# Patient Record
Sex: MALE | Race: WHITE | Employment: UNEMPLOYED | URBAN - METROPOLITAN AREA
[De-identification: names, ages, dates, MRNs, and addresses within clinical notes are randomized per-mention and may not be internally consistent; named-entity substitution may affect disease eponyms.]

---

## 2018-09-12 ENCOUNTER — APPOINTMENT (OUTPATIENT)
Dept: GENERAL RADIOLOGY | Age: 58
DRG: 140 | End: 2018-09-12
Attending: PHYSICIAN ASSISTANT
Payer: MEDICAID

## 2018-09-12 ENCOUNTER — HOSPITAL ENCOUNTER (INPATIENT)
Age: 58
LOS: 2 days | Discharge: HOME | DRG: 140 | End: 2018-09-14
Attending: EMERGENCY MEDICINE | Admitting: INTERNAL MEDICINE
Payer: MEDICAID

## 2018-09-12 DIAGNOSIS — R09.02 HYPOXIA: ICD-10-CM

## 2018-09-12 DIAGNOSIS — F17.200 TOBACCO DEPENDENCE: ICD-10-CM

## 2018-09-12 DIAGNOSIS — J44.1 COPD EXACERBATION (HCC): Primary | ICD-10-CM

## 2018-09-12 PROBLEM — J44.0 COPD (CHRONIC OBSTRUCTIVE PULMONARY DISEASE) WITH ACUTE BRONCHITIS (HCC): Status: ACTIVE | Noted: 2018-09-12

## 2018-09-12 PROBLEM — E11.9 DM (DIABETES MELLITUS) (HCC): Status: ACTIVE | Noted: 2018-09-12

## 2018-09-12 PROBLEM — E87.0 HYPERNATREMIA: Status: ACTIVE | Noted: 2018-09-12

## 2018-09-12 PROBLEM — J44.9 COPD (CHRONIC OBSTRUCTIVE PULMONARY DISEASE) (HCC): Status: ACTIVE | Noted: 2018-09-12

## 2018-09-12 PROBLEM — J20.9 COPD (CHRONIC OBSTRUCTIVE PULMONARY DISEASE) WITH ACUTE BRONCHITIS (HCC): Status: ACTIVE | Noted: 2018-09-12

## 2018-09-12 LAB
ALBUMIN SERPL-MCNC: 3.1 G/DL (ref 3.4–5)
ALBUMIN/GLOB SERPL: 0.9 {RATIO} (ref 0.8–1.7)
ALP SERPL-CCNC: 61 U/L (ref 45–117)
ALT SERPL-CCNC: 47 U/L (ref 16–61)
ANION GAP SERPL CALC-SCNC: 7 MMOL/L (ref 3–18)
AST SERPL-CCNC: 22 U/L (ref 15–37)
BASOPHILS # BLD: 0 K/UL (ref 0–0.1)
BASOPHILS NFR BLD: 1 % (ref 0–2)
BILIRUB SERPL-MCNC: 0.3 MG/DL (ref 0.2–1)
BNP SERPL-MCNC: 16 PG/ML (ref 0–900)
BUN SERPL-MCNC: 19 MG/DL (ref 7–18)
BUN/CREAT SERPL: 19 (ref 12–20)
CALCIUM SERPL-MCNC: 8.7 MG/DL (ref 8.5–10.1)
CHLORIDE SERPL-SCNC: 107 MMOL/L (ref 100–108)
CK MB CFR SERPL CALC: NORMAL % (ref 0–4)
CK MB CFR SERPL CALC: NORMAL % (ref 0–4)
CK MB SERPL-MCNC: <1 NG/ML (ref 5–25)
CK MB SERPL-MCNC: <1 NG/ML (ref 5–25)
CK SERPL-CCNC: 88 U/L (ref 39–308)
CK SERPL-CCNC: 92 U/L (ref 39–308)
CO2 SERPL-SCNC: 33 MMOL/L (ref 21–32)
CREAT SERPL-MCNC: 1.02 MG/DL (ref 0.6–1.3)
DIFFERENTIAL METHOD BLD: NORMAL
EOSINOPHIL # BLD: 0.2 K/UL (ref 0–0.4)
EOSINOPHIL NFR BLD: 3 % (ref 0–5)
ERYTHROCYTE [DISTWIDTH] IN BLOOD BY AUTOMATED COUNT: 13.2 % (ref 11.6–14.5)
EST. AVERAGE GLUCOSE BLD GHB EST-MCNC: 128 MG/DL
GLOBULIN SER CALC-MCNC: 3.6 G/DL (ref 2–4)
GLUCOSE BLD STRIP.AUTO-MCNC: 161 MG/DL (ref 70–110)
GLUCOSE SERPL-MCNC: 125 MG/DL (ref 74–99)
HBA1C MFR BLD: 6.1 % (ref 4.5–5.6)
HCT VFR BLD AUTO: 45.7 % (ref 36–48)
HGB BLD-MCNC: 15.5 G/DL (ref 13–16)
LACTATE SERPL-SCNC: 1.9 MMOL/L (ref 0.4–2)
LYMPHOCYTES # BLD: 2 K/UL (ref 0.9–3.6)
LYMPHOCYTES NFR BLD: 33 % (ref 21–52)
MCH RBC QN AUTO: 32.8 PG (ref 24–34)
MCHC RBC AUTO-ENTMCNC: 33.9 G/DL (ref 31–37)
MCV RBC AUTO: 96.6 FL (ref 74–97)
MONOCYTES # BLD: 0.5 K/UL (ref 0.05–1.2)
MONOCYTES NFR BLD: 8 % (ref 3–10)
NEUTS SEG # BLD: 3.3 K/UL (ref 1.8–8)
NEUTS SEG NFR BLD: 55 % (ref 40–73)
PLATELET # BLD AUTO: 244 K/UL (ref 135–420)
PMV BLD AUTO: 10.2 FL (ref 9.2–11.8)
POTASSIUM SERPL-SCNC: 4 MMOL/L (ref 3.5–5.5)
PROT SERPL-MCNC: 6.7 G/DL (ref 6.4–8.2)
RBC # BLD AUTO: 4.73 M/UL (ref 4.7–5.5)
SODIUM SERPL-SCNC: 147 MMOL/L (ref 136–145)
TROPONIN I SERPL-MCNC: <0.02 NG/ML (ref 0–0.06)
TROPONIN I SERPL-MCNC: <0.02 NG/ML (ref 0–0.06)
WBC # BLD AUTO: 5.9 K/UL (ref 4.6–13.2)

## 2018-09-12 PROCEDURE — 96361 HYDRATE IV INFUSION ADD-ON: CPT

## 2018-09-12 PROCEDURE — 85025 COMPLETE CBC W/AUTO DIFF WBC: CPT | Performed by: PHYSICIAN ASSISTANT

## 2018-09-12 PROCEDURE — 83605 ASSAY OF LACTIC ACID: CPT | Performed by: INTERNAL MEDICINE

## 2018-09-12 PROCEDURE — 82550 ASSAY OF CK (CPK): CPT | Performed by: PHYSICIAN ASSISTANT

## 2018-09-12 PROCEDURE — 93005 ELECTROCARDIOGRAM TRACING: CPT

## 2018-09-12 PROCEDURE — 99285 EMERGENCY DEPT VISIT HI MDM: CPT

## 2018-09-12 PROCEDURE — 94640 AIRWAY INHALATION TREATMENT: CPT

## 2018-09-12 PROCEDURE — 83036 HEMOGLOBIN GLYCOSYLATED A1C: CPT | Performed by: INTERNAL MEDICINE

## 2018-09-12 PROCEDURE — 87040 BLOOD CULTURE FOR BACTERIA: CPT | Performed by: INTERNAL MEDICINE

## 2018-09-12 PROCEDURE — 74011000250 HC RX REV CODE- 250: Performed by: PHYSICIAN ASSISTANT

## 2018-09-12 PROCEDURE — 65270000029 HC RM PRIVATE

## 2018-09-12 PROCEDURE — 94762 N-INVAS EAR/PLS OXIMTRY CONT: CPT

## 2018-09-12 PROCEDURE — 83880 ASSAY OF NATRIURETIC PEPTIDE: CPT | Performed by: PHYSICIAN ASSISTANT

## 2018-09-12 PROCEDURE — 71045 X-RAY EXAM CHEST 1 VIEW: CPT

## 2018-09-12 PROCEDURE — 36415 COLL VENOUS BLD VENIPUNCTURE: CPT | Performed by: INTERNAL MEDICINE

## 2018-09-12 PROCEDURE — 80053 COMPREHEN METABOLIC PANEL: CPT | Performed by: PHYSICIAN ASSISTANT

## 2018-09-12 PROCEDURE — 74011000250 HC RX REV CODE- 250: Performed by: INTERNAL MEDICINE

## 2018-09-12 PROCEDURE — 82962 GLUCOSE BLOOD TEST: CPT

## 2018-09-12 PROCEDURE — 77030013140 HC MSK NEB VYRM -A

## 2018-09-12 PROCEDURE — 96374 THER/PROPH/DIAG INJ IV PUSH: CPT

## 2018-09-12 PROCEDURE — 74011636637 HC RX REV CODE- 636/637: Performed by: INTERNAL MEDICINE

## 2018-09-12 PROCEDURE — 74011250636 HC RX REV CODE- 250/636: Performed by: PHYSICIAN ASSISTANT

## 2018-09-12 PROCEDURE — 74011250636 HC RX REV CODE- 250/636: Performed by: INTERNAL MEDICINE

## 2018-09-12 RX ORDER — IPRATROPIUM BROMIDE AND ALBUTEROL SULFATE 2.5; .5 MG/3ML; MG/3ML
3 SOLUTION RESPIRATORY (INHALATION)
Status: COMPLETED | OUTPATIENT
Start: 2018-09-12 | End: 2018-09-12

## 2018-09-12 RX ORDER — IPRATROPIUM BROMIDE AND ALBUTEROL SULFATE 2.5; .5 MG/3ML; MG/3ML
3 SOLUTION RESPIRATORY (INHALATION)
Status: DISCONTINUED | OUTPATIENT
Start: 2018-09-12 | End: 2018-09-14 | Stop reason: HOSPADM

## 2018-09-12 RX ORDER — HEPARIN SODIUM 5000 [USP'U]/ML
5000 INJECTION, SOLUTION INTRAVENOUS; SUBCUTANEOUS EVERY 8 HOURS
Status: CANCELLED | OUTPATIENT
Start: 2018-09-12

## 2018-09-12 RX ORDER — SODIUM CHLORIDE 9 MG/ML
100 INJECTION, SOLUTION INTRAVENOUS CONTINUOUS
Status: DISCONTINUED | OUTPATIENT
Start: 2018-09-12 | End: 2018-09-13

## 2018-09-12 RX ORDER — INSULIN LISPRO 100 [IU]/ML
INJECTION, SOLUTION INTRAVENOUS; SUBCUTANEOUS
Status: DISCONTINUED | OUTPATIENT
Start: 2018-09-12 | End: 2018-09-14 | Stop reason: HOSPADM

## 2018-09-12 RX ORDER — MAGNESIUM SULFATE 100 %
4 CRYSTALS MISCELLANEOUS AS NEEDED
Status: DISCONTINUED | OUTPATIENT
Start: 2018-09-12 | End: 2018-09-14 | Stop reason: HOSPADM

## 2018-09-12 RX ORDER — SODIUM CHLORIDE 0.9 % (FLUSH) 0.9 %
5-10 SYRINGE (ML) INJECTION AS NEEDED
Status: DISCONTINUED | OUTPATIENT
Start: 2018-09-12 | End: 2018-09-14 | Stop reason: HOSPADM

## 2018-09-12 RX ORDER — SODIUM CHLORIDE 0.9 % (FLUSH) 0.9 %
5-10 SYRINGE (ML) INJECTION EVERY 8 HOURS
Status: DISCONTINUED | OUTPATIENT
Start: 2018-09-12 | End: 2018-09-14 | Stop reason: HOSPADM

## 2018-09-12 RX ORDER — DEXTROSE 50 % IN WATER (D50W) INTRAVENOUS SYRINGE
25-50 AS NEEDED
Status: DISCONTINUED | OUTPATIENT
Start: 2018-09-12 | End: 2018-09-14 | Stop reason: HOSPADM

## 2018-09-12 RX ORDER — LEVOFLOXACIN 5 MG/ML
750 INJECTION, SOLUTION INTRAVENOUS EVERY 24 HOURS
Status: DISCONTINUED | OUTPATIENT
Start: 2018-09-12 | End: 2018-09-14 | Stop reason: HOSPADM

## 2018-09-12 RX ADMIN — INSULIN LISPRO 2 UNITS: 100 INJECTION, SOLUTION INTRAVENOUS; SUBCUTANEOUS at 21:36

## 2018-09-12 RX ADMIN — SODIUM CHLORIDE 100 ML/HR: 900 INJECTION, SOLUTION INTRAVENOUS at 21:36

## 2018-09-12 RX ADMIN — SODIUM CHLORIDE 1000 ML: 900 INJECTION, SOLUTION INTRAVENOUS at 17:32

## 2018-09-12 RX ADMIN — METHYLPREDNISOLONE SODIUM SUCCINATE 125 MG: 125 INJECTION, POWDER, FOR SOLUTION INTRAMUSCULAR; INTRAVENOUS at 17:32

## 2018-09-12 RX ADMIN — IPRATROPIUM BROMIDE AND ALBUTEROL SULFATE 3 ML: .5; 3 SOLUTION RESPIRATORY (INHALATION) at 17:51

## 2018-09-12 RX ADMIN — IPRATROPIUM BROMIDE AND ALBUTEROL SULFATE 3 ML: .5; 3 SOLUTION RESPIRATORY (INHALATION) at 23:51

## 2018-09-12 RX ADMIN — LEVOFLOXACIN 750 MG: 5 INJECTION, SOLUTION INTRAVENOUS at 21:37

## 2018-09-12 RX ADMIN — IPRATROPIUM BROMIDE AND ALBUTEROL SULFATE 3 ML: .5; 3 SOLUTION RESPIRATORY (INHALATION) at 19:40

## 2018-09-12 RX ADMIN — Medication 10 ML: at 21:37

## 2018-09-12 NOTE — ED PROVIDER NOTES
EMERGENCY DEPARTMENT HISTORY AND PHYSICAL EXAM 
 
Date: 9/12/2018 Patient Name: Dorathy Severin History of Presenting Illness Chief Complaint Patient presents with  Cough  Shortness of Breath History Provided By: Patient Chief Complaint: cough Duration: 3-4 Days Timing:  Worsening Location: chest 
Quality: productive Severity: 3 out of 10 Modifying Factors: coughing up green phlegm Associated Symptoms: wheezing, diaphoresis, SOB, dizziness, visual disturbance (black dots), and chest congestion Additional History (Context):  
5:23 PM 
Dorathy Severin is a 60 y.o. male with PMHX of COPD, DM, HLD who presents to the emergency department C/O productive cough (green phlegm). Associated sxs include wheezing, diaphoresis, SOB, dizziness, visual disturbance (black dots), and chest congestion. Pt reports he has not been able to lie down or sleep without having a \"coughing fit\". States he has a hernia and the coughing has made it protrude more. When trying to walk the pt is very dizzy and is seeing \"black dots\". Recently had a CT done at [facility] for concern of lung cancer; CT resulted negative. Pt has not been compliant with his COPD or DM medication. Being followed by URIEL Wells (family medicine). Pt denies chest pain, fever, chills, and any other sxs or complaints. PCP: Judy Mast MD 
 
 
 
Past History Past Medical History: 
Past Medical History:  
Diagnosis Date  COPD (chronic obstructive pulmonary disease) (HCC)  Diabetes ([address]  Emphysema lung ([address]  Hernia, abdominal   
 Hypercholesteremia Past Surgical History: 
Past Surgical History:  
Procedure Laterality Date  HX BACK SURGERY Family History: 
History reviewed. No pertinent family history. Social History: 
Social History Substance Use Topics  Smoking status: Current Every Day Smoker Packs/day: 0.50  Smokeless tobacco: Never Used  Alcohol use No  
 
 
 Allergies: Allergies Allergen Reactions  Oxycodone Anaphylaxis Review of Systems Review of Systems Constitutional: Positive for diaphoresis. Negative for chills and fever. HENT: Positive for congestion (chest congestion). Eyes: Positive for visual disturbance (black dots). Respiratory: Positive for cough (productive cough (green phlegm)), shortness of breath and wheezing. Cardiovascular: Negative for chest pain. Neurological: Positive for dizziness. All other systems reviewed and are negative. Physical Exam  
 
Vitals:  
 09/12/18 1925 09/12/18 1930 09/12/18 1939 09/12/18 1958 BP: 122/65 (!) 107/93 Pulse: 67 68 Resp: 18 Temp:      
SpO2: 91% 93% 92% 95% Weight:      
Height:      
 
Physical Exam  
Constitutional: He is oriented to person, place, and time. He appears well-developed and well-nourished. No distress. HENT:  
Head: Normocephalic and atraumatic. Eyes: Conjunctivae and EOM are normal. Pupils are equal, round, and reactive to light. Neck: Normal range of motion. Neck supple. Cardiovascular: Normal rate and regular rhythm. Pulmonary/Chest: Effort normal. No tachypnea. He has wheezes. He has rhonchi. +Harsh breath sounds with expiratory wheezing, +wet sounding productive cough Musculoskeletal: Normal range of motion. Neurological: He is alert and oriented to person, place, and time. Skin: Skin is warm and dry. Psychiatric: He has a normal mood and affect. His behavior is normal.  
Nursing note and vitals reviewed. Diagnostic Study Results Labs - Recent Results (from the past 12 hour(s)) CBC WITH AUTOMATED DIFF Collection Time: 09/12/18  6:00 PM  
Result Value Ref Range WBC 5.9 4.6 - 13.2 K/uL  
 RBC 4.73 4.70 - 5.50 M/uL  
 HGB 15.5 13.0 - 16.0 g/dL HCT 45.7 36.0 - 48.0 % MCV 96.6 74.0 - 97.0 FL  
 MCH 32.8 24.0 - 34.0 PG  
 MCHC 33.9 31.0 - 37.0 g/dL  
 RDW 13.2 11.6 - 14.5 % PLATELET 578 907 - 351 K/uL MPV 10.2 9.2 - 11.8 FL  
 NEUTROPHILS 55 40 - 73 % LYMPHOCYTES 33 21 - 52 % MONOCYTES 8 3 - 10 % EOSINOPHILS 3 0 - 5 % BASOPHILS 1 0 - 2 %  
 ABS. NEUTROPHILS 3.3 1.8 - 8.0 K/UL  
 ABS. LYMPHOCYTES 2.0 0.9 - 3.6 K/UL  
 ABS. MONOCYTES 0.5 0.05 - 1.2 K/UL  
 ABS. EOSINOPHILS 0.2 0.0 - 0.4 K/UL  
 ABS. BASOPHILS 0.0 0.0 - 0.1 K/UL  
 DF AUTOMATED METABOLIC PANEL, COMPREHENSIVE Collection Time: 09/12/18  6:00 PM  
Result Value Ref Range Sodium 147 (H) 136 - 145 mmol/L Potassium 4.0 3.5 - 5.5 mmol/L Chloride 107 100 - 108 mmol/L  
 CO2 33 (H) 21 - 32 mmol/L Anion gap 7 3.0 - 18 mmol/L Glucose 125 (H) 74 - 99 mg/dL BUN 19 (H) 7.0 - 18 MG/DL Creatinine 1.02 0.6 - 1.3 MG/DL  
 BUN/Creatinine ratio 19 12 - 20 GFR est AA >60 >60 ml/min/1.73m2 GFR est non-AA >60 >60 ml/min/1.73m2 Calcium 8.7 8.5 - 10.1 MG/DL Bilirubin, total 0.3 0.2 - 1.0 MG/DL  
 ALT (SGPT) 47 16 - 61 U/L  
 AST (SGOT) 22 15 - 37 U/L Alk. phosphatase 61 45 - 117 U/L Protein, total 6.7 6.4 - 8.2 g/dL Albumin 3.1 (L) 3.4 - 5.0 g/dL Globulin 3.6 2.0 - 4.0 g/dL A-G Ratio 0.9 0.8 - 1.7 CARDIAC PANEL,(CK, CKMB & TROPONIN) Collection Time: 09/12/18  6:00 PM  
Result Value Ref Range CK 88 39 - 308 U/L  
 CK - MB <1.0 <3.6 ng/ml CK-MB Index  0.0 - 4.0 % CALCULATION NOT PERFORMED WHEN RESULT IS BELOW LINEAR LIMIT Troponin-I, Qt. <0.02 0.00 - 0.06 NG/ML  
NT-PRO BNP Collection Time: 09/12/18  6:00 PM  
Result Value Ref Range NT pro-BNP 16 0 - 900 PG/ML  
EKG, 12 LEAD, INITIAL Collection Time: 09/12/18  6:29 PM  
Result Value Ref Range Ventricular Rate 64 BPM  
 Atrial Rate 64 BPM  
 P-R Interval 130 ms QRS Duration 86 ms  
 Q-T Interval 408 ms QTC Calculation (Bezet) 420 ms Calculated P Axis 45 degrees Calculated R Axis 77 degrees Calculated T Axis 77 degrees Diagnosis Normal sinus rhythm Normal ECG 
 No previous ECGs available Radiologic Studies -  
XR CHEST PORT Final Result Findings of mild pulmonary edema. As read by the radiologist.  
 
CT Results  (Last 48 hours) None CXR Results  (Last 48 hours) 09/12/18 1741  XR CHEST PORT Final result Impression:  IMPRESSION:  
   
Findings of mild pulmonary edema.  
   
_______________ Narrative:  EXAM: XR CHEST PORT  
   
CLINICAL HISTORY: cough.  
-Additional: None. TECHNIQUE: A portable erect AP radiographic view of the chest is reviewed  
without comparison. _______________ FINDINGS:  
   
There is diffuse vascular and interstitial prominence, consistent with mild  
pulmonary edema. The pleural spaces are clear. The cardiac silhouette, maya, and  
mediastinal contours are normal for age. No acute osseous abnormality is  
revealed. _______________ Medications given in the ED- Medications  
albuterol-ipratropium (DUO-NEB) 2.5 MG-0.5 MG/3 ML (3 mL Nebulization Given 9/12/18 1751) methylPREDNISolone (PF) (SOLU-MEDROL) injection 125 mg (125 mg IntraVENous Given 9/12/18 1732) sodium chloride 0.9 % bolus infusion 1,000 mL (0 mL IntraVENous IV Completed 9/12/18 1932)  
albuterol-ipratropium (DUO-NEB) 2.5 MG-0.5 MG/3 ML (3 mL Nebulization Given 9/12/18 1751)  
albuterol-ipratropium (DUO-NEB) 2.5 MG-0.5 MG/3 ML (3 mL Nebulization Given 9/12/18 1940) Medical Decision Making I am the first provider for this patient. I reviewed the vital signs, available nursing notes, past medical history, past surgical history, family history and social history. Vital Signs-Reviewed the patient's vital signs. EKG interpretation: (Preliminary) 6:29 PM  
64 bpm; NSR; No ST Elevation; No STEMI 
EKG read by Silvio Campa PA-C at 6:32 PM  
 
CARDIAC MONITOR: 
Cardiac Rhythm: NSR Rate: 68 bpm 
 
Pulse Oximetry Analysis - 95% on RA Records Reviewed: Nursing Notes Procedures: Procedures ED Course:  
5:23 PM 
Initial assessment performed. The patients presenting problems have been discussed, and they are in agreement with the care plan formulated and outlined with them. I have encouraged them to ask questions as they arise throughout their visit. 6:35 PM After 2 nebulizer treatments, pt still with some wheezing and harsh breath sounds. Very productive wet sounding cough. Pts O2 sat during discussion of results hovering around 90% on RA, however he did have a couple of dips to 88% and 89% on RA. Will order another nebulizer treatment and reassess. Pt likely will require admission. 6:40 PM Discussed patient's history, exam, vital signs, labs and available diagnostics results with Benito Sanchez DO, ED Attending, who agrees with admission. FACE-TO-FACE PROGRESS NOTE: 
1052 PM 
60year old homeless male, active smoker, presenting with chronic cough but worsening SOB. Noted at triage that pt at 89% ORA. On exam he appears mildly dyspneic with speaking and saturating at 90%. Rhonchus breath sounds with bibasilar crackles and expiratory wheezes. Pt CXR showed mild pulmonary edema. Will add BNP to workup to evaluate if pt needs diuresis. Due to hypoxia will likely need admission. Cardiac enzymes negative. I have personally seen and examined the patient, reviewed the REGINALDO's note and agree with findings and plan. Written by Nadira Orellana, ED Scribe, as dictated by Benito Sanchez DO. 
 
7:40 PM Discussed patient's history, exam, and available diagnostics results with Francesca Sutherland MD, internal medicine, who accepts the pt for admission to remote tele. Diagnosis and Disposition Core Measures: 
For Hospitalized Patients: 
 
1. Hospitalization Decision Time: The decision to hospitalize the patient was made by Flip Deal PA-C at 6:35 PM on 9/12/2018 2.  Aspirin: Aspirin was not given because the patient did not present with a stroke at the time of their Emergency Department evaluation 7:44 PM  Patient is being admitted to the hospital by Vinny Rosario MD to remote tele. The results of their tests and reasons for their admission have been discussed with them and/or available family. They convey agreement and understanding for the need to be admitted and for their admission diagnosis. CONDITIONS ON ADMISSION: 
Sepsis is not present at the time of admission. Deep Vein Thrombosis is not present at the time of admission. Thrombosis is not present at the time of admission. Urinary Tract Infection is not present at the time of admission. Pneumonia is not present at the time of admission. MRSA is not present at the time of admission. Wound infection is not present at the time of admission. Pressure Ulcer is not present at the time of admission. CLINICAL IMPRESSION: 
 
1. COPD exacerbation ([address] 2. Hypoxia 3. Tobacco dependence   
  
_______________________ Attestations: This note is prepared by Itzel Tapia, acting as Scribe for Silvio Campa PA-C. Silvio Campa PA-C:  The scribe's documentation has been prepared under my direction and personally reviewed by me in its entirety. I confirm that the note above accurately reflects all work, treatment, procedures, and medical decision making performed by me. 
 
_______________________________

## 2018-09-12 NOTE — ED TRIAGE NOTES
Triage: pt reports productive cough x 3 days. SOB with exertion. Pt states that he is homeless and has been living in his car x 4 years.

## 2018-09-12 NOTE — H&P
History & Physical 
 
Patient: Nic Gotti MRN: [id number]  CSN: [id number] YOB: 1960  Age: 60 y.o. Sex: male DOA: 9/12/2018 Chief Complaint:  
Chief Complaint Patient presents with  Cough  Shortness of Breath HPI:  
 
Nic Gotti is a 60 y.o.  male who has hx of COPD, DM , Sleep Apnea Presents to ER with coughing spells and worsening shortness of breath for the last four days Patient was on oxygen in past which was lost when he became homeless currently living in a mobile [facility] has chronic dyspnea followed by NP in [place] for Advair refills ect. Casie Avery Continues  to smoke half a pack day but lately having trouble inhaling full cigarette and has had to cut back due to worsening shortness of breath . In ER had episodes of hypoxemia down to 88 86 percent on RA despite Being treated with Duo neb, Solumedrol ect. .. Past Medical History:  
Diagnosis Date  COPD (chronic obstructive pulmonary disease) (HCC)  [address] [address]  Emphysema lung ([facility] [address]  Hernia, abdominal   
 Hypercholesteremia Past Surgical History:  
Procedure Laterality Date  HX BACK SURGERY History reviewed. No pertinent family history. Social History Social History  Marital status:  Spouse name: N/A  
 Number of children: N/A  
 Years of education: N/A Social History Main Topics  Smoking status: Current Every Day Smoker Packs/day: 0.50  Smokeless tobacco: Never Used  Alcohol use No  
 Drug use: None  Sexual activity: Not Asked Other Topics Concern  None Social History Narrative  None Prior to Admission medications Not on File Allergies Allergen Reactions  Oxycodone Anaphylaxis Review of Systems GENERAL: Patient alert, awake and oriented times 3, able to communicate full sentences and not in distress. HEENT: No change in vision, no earache, tinnitus, sore throat or sinus congestion. NECK: No pain or stiffness. PULMONARY: + shortness of breath,+ cough +wheeze. Cardiovascular: no pnd or orthopnea, no CP GASTROINTESTINAL: No abdominal pain, nausea, vomiting or diarrhea, melena or bright red blood per rectum. GENITOURINARY: No urinary frequency, urgency, hesitancy or dysuria. MUSCULOSKELETAL: No joint or muscle pain, no back pain, no recent trauma. DERMATOLOGIC: No rash, no itching, no lesions. ENDOCRINE: No polyuria, polydipsia, no heat or cold intolerance. No recent change in weight. HEMATOLOGICAL: No anemia or easy bruising or bleeding. NEUROLOGIC: No headache, seizures, numbness, tingling or weakness. Physical Exam:  
 
Physical Exam: 
Visit Vitals  BP (!) 107/93  Pulse 68  Temp 97.7 °F (36.5 °C)  Resp 18  Ht 5' 11\" (1.803 m)  Wt 114.3 kg (252 lb)  SpO2 92%  BMI 35.15 kg/m2 O2 Device: Room air Temp (24hrs), Av.7 °F (36.5 °C), Min:97.7 °F (36.5 °C), Max:97.7 °F (36.5 °C) General:  Alert, cooperative, no distress, appears stated age. Head: Normocephalic, without obvious abnormality, atraumatic. adententoulous hard of hearing Eyes:  Conjunctivae/corneas clear. PERRL, EOMs intact. Apnea screen 4 Nose: Nares normal. No drainage or sinus tenderness. Neck: Supple, symmetrical, trachea midline, no adenopathy, thyroid: no enlargement, no carotid bruit and no JVD. Lungs:   Clear to auscultation bilaterally. Diffuse exp wheeze Heart:  Regular rate and rhythm, S1, S2 normal.  
  Abdomen: Soft, non-tender. Bowel sounds normal.   
Extremities: Extremities normal, atraumatic, no cyanosis or edema. Pulses: 2+ and symmetric all extremities. Skin:  No rashes or lesions Neurologic: AAOx3, No focal motor or sensory deficit. Labs Reviewed: 
Recent Results (from the past 24 hour(s)) CBC WITH AUTOMATED DIFF  
 Collection Time: 09/12/18  6:00 PM  
Result Value Ref Range WBC 5.9 4.6 - 13.2 K/uL  
 RBC 4.73 4.70 - 5.50 M/uL  
 HGB 15.5 13.0 - 16.0 g/dL HCT 45.7 36.0 - 48.0 % MCV 96.6 74.0 - 97.0 FL  
 MCH 32.8 24.0 - 34.0 PG  
 MCHC 33.9 31.0 - 37.0 g/dL  
 RDW 13.2 11.6 - 14.5 % PLATELET 986 473 - 024 K/uL MPV 10.2 9.2 - 11.8 FL  
 NEUTROPHILS 55 40 - 73 % LYMPHOCYTES 33 21 - 52 % MONOCYTES 8 3 - 10 % EOSINOPHILS 3 0 - 5 % BASOPHILS 1 0 - 2 %  
 ABS. NEUTROPHILS 3.3 1.8 - 8.0 K/UL  
 ABS. LYMPHOCYTES 2.0 0.9 - 3.6 K/UL  
 ABS. MONOCYTES 0.5 0.05 - 1.2 K/UL  
 ABS. EOSINOPHILS 0.2 0.0 - 0.4 K/UL  
 ABS. BASOPHILS 0.0 0.0 - 0.1 K/UL  
 DF AUTOMATED METABOLIC PANEL, COMPREHENSIVE Collection Time: 09/12/18  6:00 PM  
Result Value Ref Range Sodium 147 (H) 136 - 145 mmol/L Potassium 4.0 3.5 - 5.5 mmol/L Chloride 107 100 - 108 mmol/L  
 CO2 33 (H) 21 - 32 mmol/L Anion gap 7 3.0 - 18 mmol/L Glucose 125 (H) 74 - 99 mg/dL BUN 19 (H) 7.0 - 18 MG/DL Creatinine 1.02 0.6 - 1.3 MG/DL  
 BUN/Creatinine ratio 19 12 - 20 GFR est AA >60 >60 ml/min/1.73m2 GFR est non-AA >60 >60 ml/min/1.73m2 Calcium 8.7 8.5 - 10.1 MG/DL Bilirubin, total 0.3 0.2 - 1.0 MG/DL  
 ALT (SGPT) 47 16 - 61 U/L  
 AST (SGOT) 22 15 - 37 U/L Alk. phosphatase 61 45 - 117 U/L Protein, total 6.7 6.4 - 8.2 g/dL Albumin 3.1 (L) 3.4 - 5.0 g/dL Globulin 3.6 2.0 - 4.0 g/dL A-G Ratio 0.9 0.8 - 1.7 CARDIAC PANEL,(CK, CKMB & TROPONIN) Collection Time: 09/12/18  6:00 PM  
Result Value Ref Range CK 88 39 - 308 U/L  
 CK - MB <1.0 <3.6 ng/ml CK-MB Index  0.0 - 4.0 % CALCULATION NOT PERFORMED WHEN RESULT IS BELOW LINEAR LIMIT Troponin-I, Qt. <0.02 0.00 - 0.06 NG/ML  
NT-PRO BNP Collection Time: 09/12/18  6:00 PM  
Result Value Ref Range NT pro-BNP 16 0 - 900 PG/ML  
EKG, 12 LEAD, INITIAL Collection Time: 09/12/18  6:29 PM  
Result Value Ref Range  Ventricular Rate 64 BPM  
 Atrial Rate 64 BPM  
 P-R Interval 130 ms QRS Duration 86 ms  
 Q-T Interval 408 ms QTC Calculation (Bezet) 420 ms Calculated P Axis 45 degrees Calculated R Axis 77 degrees Calculated T Axis 77 degrees Diagnosis Normal sinus rhythm Normal ECG No previous ECGs available All lab results for the last 24 hours reviewed. and EKG Procedures/imaging: see electronic medical records for all procedures/Xrays and details which were not copied into this note but were reviewed prior to creation of Plan Assessment/Plan Principal Problem: Hypoxia (9/12/2018) Evaluate for home oxygen Active Problems: COPD (chronic obstructive pulmonary disease) ([facility] [address] (9/12/2018) Solumedrol  
duoneb Levaquin Check cultures Hypernatremia 
 
 low dose fluids Intertial Edema on xray Check lactate Check echo Tobacco Abuse Smoking cessation advised DVT/GI Prophylaxis: Hep SQ Discussed with patient at bedside about hospital admission and my plan care, who understood and agree with my plan care.  
 
Leandro Roper MD 
9/12/2018 7:48 PM

## 2018-09-12 NOTE — IP AVS SNAPSHOT
[address] [address] 
[phone] Patient: Dominique Pierre MRN: [id number] AVX:1/1/4635 About your hospitalization You were admitted on:  September 12, 2018 You last received care in the:  [address] [place] [address] You were discharged on:  September 14, 2018 Why you were hospitalized Your primary diagnosis was:  Hypoxia Your diagnoses also included:  Copd (Chronic Obstructive Pulmonary Disease) ([facility]), Hypoxemia, Hypernatremia, Dm (Diabetes Mellitus) ([facility]), Copd (Chronic Obstructive Pulmonary Disease) With Acute Bronchitis ([facility]) Follow-up Information Follow up With Details Comments Contact Info Jan Hobson NP On 9/19/2018 Follow up appointment scheduled for September 19, 2018 at 12:00 p.m. (noon). [address] [place] [phone] Phys Kezia, MD   Patient can only remember the practice name and not the physician Discharge Orders None A check elaine indicates which time of day the medication should be taken. My Medications START taking these medications Instructions Each Dose to Equal  
 Morning Noon Evening Bedtime  
 levoFLOXacin 750 mg tablet Commonly known as:  Hannah Overton Your last dose was: Your next dose is: Take 1 Tab by mouth daily. 750 mg  
    
   
   
   
  
 predniSONE 10 mg tablet Commonly known as:  Chastity Botello Your last dose was: Your next dose is:    
   
   
 Days 1 & 2: Take FOUR tabs. Days 3 & 4: Take THREE tabs. Days 5 & 6: Take TWO tabs. Days 7 & 8: Take ONE tab. CONTINUE taking these medications Instructions Each Dose to Equal  
 Morning Noon Evening Bedtime ADVAIR DISKUS 250-50 mcg/dose diskus inhaler Generic drug:  fluticasone-salmeterol Your last dose was: Your next dose is: Take 1 Puff by inhalation every twelve (12) hours. 1 Puff CRESTOR 40 mg tablet Generic drug:  rosuvastatin Your last dose was: Your next dose is: Take 40 mg by mouth nightly. 40 mg  
    
   
   
   
  
 glipiZIDE 10 mg tablet Commonly known as:  Qian Roca Your last dose was: Your next dose is: Take 10 mg by mouth daily. 10 mg PROAIR HFA 90 mcg/actuation inhaler Generic drug:  albuterol Your last dose was: Your next dose is: Take 2 Puffs by inhalation every six (6) hours as needed for Wheezing. 2 Puff SPIRIVA WITH HANDIHALER 18 mcg inhalation capsule Generic drug:  tiotropium Your last dose was: Your next dose is: Take 1 Cap by inhalation daily. 1 Cap  
    
   
   
   
  
 traZODone 50 mg tablet Commonly known as:  Yonatan Turner Your last dose was: Your next dose is: Take 50 mg by mouth nightly. 50 mg WELLBUTRIN  mg SR tablet Generic drug:  buPROPion SR Your last dose was: Your next dose is: Take 150 mg by mouth two (2) times a day. 150 mg Where to Get Your Medications These medications were sent to [facility]/pharmacy #[id number]- [place], [address] [facility] [address]  [address], [address] [address] Phone:  [phone]  
  levoFLOXacin 750 mg tablet  
 predniSONE 10 mg tablet Discharge Instructions Learning About ARBs Introduction ARBs (angiotensin II receptor blockers) block a hormone that makes blood vessels narrow. As a result, the blood vessels relax and widen. This lowers blood pressure. ARBs also put more water and salt into the urine. This also lowers blood pressure. ARBs can treat: 
· High blood pressure. · Coronary artery disease. · Heart failure. They also may be used to help your kidneys when you have diabetes. Examples ARBs include: 
· Candesartan (Atacand). · Irbesartan (Avapro). · Losartan (Cozaar). This is not a complete list of all ARBs. Possible side effects Side effects may include: 
· Low blood pressure. You may feel dizzy and weak. · Diarrhea. · High potassium levels. You may have other side effects or reactions not listed here. Check the information that comes with your medicine. What to know about taking this medicine · ARBs may be used if you had a cough when you tried to take an ACE inhibitor. ARBs are less likely to cause a cough. · You may need regular blood tests. · Take your medicines exactly as prescribed. Call your doctor if you think you are having a problem with your medicine. · Tell your doctor or pharmacist all the medicines you take. This includes over-the-counter medicines, vitamins, herbal products, and supplements. Taking some medicines together can cause problems. · You should not take ARBs if you are pregnant or planning to become pregnant. Where can you learn more? Go to http://huber-richelle.info/. Enter K212 in the search box to learn more about \"Learning About ARBs. \" Current as of: December 6, 2017 Content Version: 11.7 © 1779-7478 "[employer]", [employer]. Care instructions adapted under license by [employer] (which disclaims liability or warranty for this information). If you have questions about a medical condition or this instruction, always ask your healthcare professional. [place] [address] any warranty or liability for your use of this information. [employer]move Announcement We are excited to announce that we are making your provider's discharge notes available to you in [employer].   You will see these notes when they are completed and signed by the physician that discharged you from your recent hospital stay. If you have any questions or concerns about any information you see in Extenda-Dent, please call the Health Information Department where you were seen or reach out to your Primary Care Provider for more information about your plan of care. Introducing [facility] [place] [facility]! [employer] introduces Extenda-Dent patient portal. Now you can access parts of your medical record, email your doctor's office, and request medication refills online. 1. In your internet browser, go to [email or web address] 2. Click on the First Time User? Click Here link in the Sign In box. You will see the New Member Sign Up page. 3. Enter your Extenda-Dent Access Code exactly as it appears below. You will not need to use this code after youve completed the sign-up process. If you do not sign up before the expiration date, you must request a new code. · Extenda-Dent Access Code: [email or web address] Expires: 12/11/2018  5:09 PM 
 
4. Enter the last four digits of your Social Security Number (xxxx) and Date of Birth (mm/dd/yyyy) as indicated and click Submit. You will be taken to the next sign-up page. 5. Create a Extenda-Dent ID. This will be your Extenda-Dent login ID and cannot be changed, so think of one that is secure and easy to remember. 6. Create a Extenda-Dent password. You can change your password at any time. 7. Enter your Password Reset Question and Answer. This can be used at a later time if you forget your password. 8. Enter your e-mail address. You will receive e-mail notification when new information is available in [address] 9. Click Sign Up. You can now view and download portions of your medical record. 10. Click the Download Summary menu link to download a portable copy of your medical information. If you have questions, please visit the Frequently Asked Questions section of the Extenda-Dent website. Remember, Extenda-Dent is NOT to be used for urgent needs. For medical emergencies, dial 911. Now available from your iPhone and Android! Introducing Zeus Degroot As a [employer] patient, I wanted to make you aware of our electronic visit tool called Zeus Degroot. [employer] 24/7 allows you to connect within minutes with a medical provider 24 hours a day, seven days a week via a mobile device or tablet or logging into a secure website from your computer. You can access Zeus Degroot from anywhere in the [place]. A virtual visit might be right for you when you have a simple condition and feel like you just dont want to get out of bed, or cant get away from work for an appointment, when your regular [employer] provider is not available (evenings, weekends or holidays), or when youre out of town and need minor care. Electronic visits cost only $49 and if the [employer] 24/7 provider determines a prescription is needed to treat your condition, one can be electronically transmitted to a nearby pharmacy*. Please take a moment to enroll today if you have not already done so. The enrollment process is free and takes just a few minutes. To enroll, please download the [employer] 24/7 ruben to your tablet or phone, or visit [email or web address] to enroll on your computer. And, as an [address] [place] [address] patient with a Moovweb account, the results of your visits will be scanned into your electronic medical record and your primary care provider will be able to view the scanned results. We urge you to continue to see your regular [employer] provider for your ongoing medical care. And while your primary care provider may not be the one available when you seek a Zeus Degroot virtual visit, the peace of mind you get from getting a real diagnosis real time can be priceless. For more information on Zeus Degroot, view our Frequently Asked Questions (FAQs) at [email or web address]. Sincerely, 
 
Tj Erazo MD 
Chief Medical Officer [facility] Cira Salazar *:  certain medications cannot be prescribed via Zeus Degroot Unresulted Labs-Please follow up with your PCP about these lab tests Order Current Status CULTURE, BLOOD Preliminary result CULTURE, BLOOD Preliminary result CULTURE, RESPIRATORY/SPUTUM/BRONCH W GRAM STAIN Preliminary result EKG, 12 LEAD, INITIAL Preliminary result Providers Seen During Your Hospitalization Provider Specialty Primary office phone General Meir DO Emergency Medicine [phone] Sharon Campbell MD Internal Medicine [phone] Your Primary Care Physician (PCP) Primary Care Physician Office Phone Office Fax OTHER, PHYS ** None ** ** None ** You are allergic to the following Allergen Reactions Oxycodone Anaphylaxis Recent Documentation Height Weight BMI Smoking Status 1.803 m 114.3 kg 35.14 kg/m2 Current Every Day Smoker Emergency Contacts Name Discharge Info Relation Home Work Mobile No,One DECLINED CAREGIVER [4] Unknown [9] [phone] Patient Belongings The following personal items are in your possession at time of discharge: 
  Dental Appliances: None  Visual Aid: Glasses      Home Medications: None   Jewelry: None  Clothing: Pants, Shirt, Footwear, At bedside    Other Valuables: Cell Phone, At bedside Please provide this summary of care documentation to your next provider. Signatures-by signing, you are acknowledging that this After Visit Summary has been reviewed with you and you have received a copy. Patient Signature:  ____________________________________________________________ Date:  ____________________________________________________________  
  
Bebe Cyndi Provider Signature:  ____________________________________________________________ Date:  ____________________________________________________________

## 2018-09-13 ENCOUNTER — APPOINTMENT (OUTPATIENT)
Dept: NON INVASIVE DIAGNOSTICS | Age: 58
DRG: 140 | End: 2018-09-13
Attending: INTERNAL MEDICINE
Payer: MEDICAID

## 2018-09-13 LAB
ANION GAP SERPL CALC-SCNC: 9 MMOL/L (ref 3–18)
BASOPHILS # BLD: 0 K/UL (ref 0–0.1)
BASOPHILS NFR BLD: 0 % (ref 0–2)
BUN SERPL-MCNC: 18 MG/DL (ref 7–18)
BUN/CREAT SERPL: 19 (ref 12–20)
CALCIUM SERPL-MCNC: 8.6 MG/DL (ref 8.5–10.1)
CHLORIDE SERPL-SCNC: 107 MMOL/L (ref 100–108)
CK MB CFR SERPL CALC: NORMAL % (ref 0–4)
CK MB CFR SERPL CALC: NORMAL % (ref 0–4)
CK MB SERPL-MCNC: <1 NG/ML (ref 5–25)
CK MB SERPL-MCNC: <1 NG/ML (ref 5–25)
CK SERPL-CCNC: 89 U/L (ref 39–308)
CK SERPL-CCNC: 99 U/L (ref 39–308)
CO2 SERPL-SCNC: 29 MMOL/L (ref 21–32)
CREAT SERPL-MCNC: 0.95 MG/DL (ref 0.6–1.3)
DIFFERENTIAL METHOD BLD: ABNORMAL
ECHO AO ASC DIAM: 3.55 CM
ECHO AO ROOT DIAM: 3.17 CM
ECHO AV AREA PEAK VELOCITY: 1.7 CM2
ECHO AV AREA VTI: 1.9 CM2
ECHO AV AREA/BSA PEAK VELOCITY: 0.7 CM2/M2
ECHO AV AREA/BSA VTI: 0.8 CM2/M2
ECHO AV CUSP MM: 2.14 CM
ECHO AV MEAN GRADIENT: 6.1 MMHG
ECHO AV PEAK GRADIENT: 13.1 MMHG
ECHO AV PEAK VELOCITY: 180.81 CM/S
ECHO AV VTI: 33.11 CM
ECHO LA MAJOR AXIS: 3.85 CM
ECHO LA VOL 2C: 54.27 ML (ref 18–58)
ECHO LA VOL 4C: 38.14 ML (ref 18–58)
ECHO LA VOL BP: 47.9 ML (ref 18–58)
ECHO LA VOL/BSA BIPLANE: 20.62 ML/M2
ECHO LA VOLUME INDEX A2C: 23.37 ML/M2
ECHO LA VOLUME INDEX A4C: 16.42 ML/M2
ECHO LV E' LATERAL VELOCITY: 0.14 CM/S
ECHO LV E' SEPTAL VELOCITY: 0.09 CM/S
ECHO LV EDV A2C: 134.2 ML
ECHO LV EDV A4C: 167.3 ML
ECHO LV EDV BP: 156.4 ML (ref 67–155)
ECHO LV EDV INDEX A4C: 72 ML/M2
ECHO LV EDV INDEX BP: 67.3 ML/M2
ECHO LV EDV NDEX A2C: 57.8 ML/M2
ECHO LV EJECTION FRACTION A2C: 75 %
ECHO LV EJECTION FRACTION A4C: 61 %
ECHO LV EJECTION FRACTION BIPLANE: 68.3 % (ref 55–100)
ECHO LV ESV A2C: 33.2 ML
ECHO LV ESV A4C: 65.2 ML
ECHO LV ESV BP: 49.6 ML (ref 22–58)
ECHO LV ESV INDEX A2C: 14.3 ML/M2
ECHO LV ESV INDEX A4C: 28.1 ML/M2
ECHO LV ESV INDEX BP: 21.4 ML/M2
ECHO LV INTERNAL DIMENSION DIASTOLIC: 5.51 CM (ref 4.2–5.9)
ECHO LV INTERNAL DIMENSION SYSTOLIC: 3 CM
ECHO LV IVSD: 1.26 CM (ref 0.6–1)
ECHO LV MASS 2D: 355.8 G (ref 88–224)
ECHO LV MASS INDEX 2D: 153.2 G/M2
ECHO LV POSTERIOR WALL DIASTOLIC: 1.28 CM (ref 0.6–1)
ECHO LVOT DIAM: 1.81 CM
ECHO LVOT PEAK GRADIENT: 5.6 MMHG
ECHO LVOT PEAK VELOCITY: 118.04 CM/S
ECHO LVOT VTI: 24.8 CM
ECHO MV A VELOCITY: 118.33 CM/S
ECHO MV AREA PHT: 2.9 CM2
ECHO MV E DECELERATION TIME (DT): 263.9 MS
ECHO MV E VELOCITY: 0.92 CM/S
ECHO MV E/A RATIO: 0.01
ECHO MV E/E' LATERAL: 6.57
ECHO MV E/E' RATIO (AVERAGED): 8.4
ECHO MV E/E' SEPTAL: 10.22
ECHO MV PRESSURE HALF TIME (PHT): 76.5 MS
ECHO RA AREA 4C: 19.84 CM2
ECHO RV INTERNAL DIMENSION: 3.97 CM
ECHO RV TAPSE: 3.1 CM (ref 1.5–2)
EOSINOPHIL # BLD: 0 K/UL (ref 0–0.4)
EOSINOPHIL NFR BLD: 0 % (ref 0–5)
ERYTHROCYTE [DISTWIDTH] IN BLOOD BY AUTOMATED COUNT: 13.1 % (ref 11.6–14.5)
GLUCOSE BLD STRIP.AUTO-MCNC: 187 MG/DL (ref 70–110)
GLUCOSE BLD STRIP.AUTO-MCNC: 225 MG/DL (ref 70–110)
GLUCOSE BLD STRIP.AUTO-MCNC: 237 MG/DL (ref 70–110)
GLUCOSE BLD STRIP.AUTO-MCNC: 239 MG/DL (ref 70–110)
GLUCOSE SERPL-MCNC: 222 MG/DL (ref 74–99)
HCT VFR BLD AUTO: 46.5 % (ref 36–48)
HGB BLD-MCNC: 15.4 G/DL (ref 13–16)
LYMPHOCYTES # BLD: 0.6 K/UL (ref 0.9–3.6)
LYMPHOCYTES NFR BLD: 11 % (ref 21–52)
MAGNESIUM SERPL-MCNC: 2.1 MG/DL (ref 1.6–2.6)
MCH RBC QN AUTO: 32.2 PG (ref 24–34)
MCHC RBC AUTO-ENTMCNC: 33.1 G/DL (ref 31–37)
MCV RBC AUTO: 97.3 FL (ref 74–97)
MONOCYTES # BLD: 0.1 K/UL (ref 0.05–1.2)
MONOCYTES NFR BLD: 1 % (ref 3–10)
NEUTS SEG # BLD: 5.1 K/UL (ref 1.8–8)
NEUTS SEG NFR BLD: 88 % (ref 40–73)
PLATELET # BLD AUTO: 240 K/UL (ref 135–420)
PMV BLD AUTO: 10.3 FL (ref 9.2–11.8)
POTASSIUM SERPL-SCNC: 4.6 MMOL/L (ref 3.5–5.5)
RBC # BLD AUTO: 4.78 M/UL (ref 4.7–5.5)
SODIUM SERPL-SCNC: 145 MMOL/L (ref 136–145)
TROPONIN I SERPL-MCNC: <0.02 NG/ML (ref 0–0.06)
TROPONIN I SERPL-MCNC: <0.02 NG/ML (ref 0–0.06)
WBC # BLD AUTO: 5.8 K/UL (ref 4.6–13.2)

## 2018-09-13 PROCEDURE — 80048 BASIC METABOLIC PNL TOTAL CA: CPT | Performed by: INTERNAL MEDICINE

## 2018-09-13 PROCEDURE — 74011636637 HC RX REV CODE- 636/637: Performed by: INTERNAL MEDICINE

## 2018-09-13 PROCEDURE — 36415 COLL VENOUS BLD VENIPUNCTURE: CPT | Performed by: INTERNAL MEDICINE

## 2018-09-13 PROCEDURE — 74011250636 HC RX REV CODE- 250/636: Performed by: INTERNAL MEDICINE

## 2018-09-13 PROCEDURE — 94760 N-INVAS EAR/PLS OXIMETRY 1: CPT

## 2018-09-13 PROCEDURE — 65270000029 HC RM PRIVATE

## 2018-09-13 PROCEDURE — 82550 ASSAY OF CK (CPK): CPT | Performed by: INTERNAL MEDICINE

## 2018-09-13 PROCEDURE — 83735 ASSAY OF MAGNESIUM: CPT | Performed by: INTERNAL MEDICINE

## 2018-09-13 PROCEDURE — 87070 CULTURE OTHR SPECIMN AEROBIC: CPT | Performed by: INTERNAL MEDICINE

## 2018-09-13 PROCEDURE — 74011250636 HC RX REV CODE- 250/636: Performed by: PHYSICIAN ASSISTANT

## 2018-09-13 PROCEDURE — 77010033678 HC OXYGEN DAILY

## 2018-09-13 PROCEDURE — 85025 COMPLETE CBC W/AUTO DIFF WBC: CPT | Performed by: INTERNAL MEDICINE

## 2018-09-13 PROCEDURE — 74011636637 HC RX REV CODE- 636/637: Performed by: PHYSICIAN ASSISTANT

## 2018-09-13 PROCEDURE — 74011000250 HC RX REV CODE- 250: Performed by: INTERNAL MEDICINE

## 2018-09-13 PROCEDURE — 82962 GLUCOSE BLOOD TEST: CPT

## 2018-09-13 PROCEDURE — 74011250637 HC RX REV CODE- 250/637: Performed by: HOSPITALIST

## 2018-09-13 PROCEDURE — 94640 AIRWAY INHALATION TREATMENT: CPT

## 2018-09-13 PROCEDURE — C8929 TTE W OR WO FOL WCON,DOPPLER: HCPCS

## 2018-09-13 RX ORDER — INSULIN LISPRO 100 [IU]/ML
5 INJECTION, SOLUTION INTRAVENOUS; SUBCUTANEOUS
Status: DISCONTINUED | OUTPATIENT
Start: 2018-09-13 | End: 2018-09-14 | Stop reason: HOSPADM

## 2018-09-13 RX ORDER — SODIUM CHLORIDE 9 MG/ML
25 INJECTION, SOLUTION INTRAVENOUS CONTINUOUS
Status: DISCONTINUED | OUTPATIENT
Start: 2018-09-13 | End: 2018-09-14 | Stop reason: HOSPADM

## 2018-09-13 RX ORDER — FUROSEMIDE 20 MG/1
10 TABLET ORAL DAILY
Status: COMPLETED | OUTPATIENT
Start: 2018-09-13 | End: 2018-09-14

## 2018-09-13 RX ORDER — HEPARIN SODIUM 5000 [USP'U]/ML
5000 INJECTION, SOLUTION INTRAVENOUS; SUBCUTANEOUS EVERY 8 HOURS
Status: DISCONTINUED | OUTPATIENT
Start: 2018-09-13 | End: 2018-09-14 | Stop reason: HOSPADM

## 2018-09-13 RX ADMIN — IPRATROPIUM BROMIDE AND ALBUTEROL SULFATE 3 ML: .5; 3 SOLUTION RESPIRATORY (INHALATION) at 20:47

## 2018-09-13 RX ADMIN — SODIUM CHLORIDE 100 ML/HR: 900 INJECTION, SOLUTION INTRAVENOUS at 10:33

## 2018-09-13 RX ADMIN — IPRATROPIUM BROMIDE AND ALBUTEROL SULFATE 3 ML: .5; 3 SOLUTION RESPIRATORY (INHALATION) at 11:01

## 2018-09-13 RX ADMIN — FUROSEMIDE 10 MG: 20 TABLET ORAL at 13:46

## 2018-09-13 RX ADMIN — INSULIN LISPRO 4 UNITS: 100 INJECTION, SOLUTION INTRAVENOUS; SUBCUTANEOUS at 21:38

## 2018-09-13 RX ADMIN — INSULIN LISPRO 4 UNITS: 100 INJECTION, SOLUTION INTRAVENOUS; SUBCUTANEOUS at 12:38

## 2018-09-13 RX ADMIN — HEPARIN SODIUM 5000 UNITS: 5000 INJECTION, SOLUTION INTRAVENOUS; SUBCUTANEOUS at 20:10

## 2018-09-13 RX ADMIN — Medication 10 ML: at 13:46

## 2018-09-13 RX ADMIN — HEPARIN SODIUM 5000 UNITS: 5000 INJECTION, SOLUTION INTRAVENOUS; SUBCUTANEOUS at 12:37

## 2018-09-13 RX ADMIN — Medication 10 ML: at 21:41

## 2018-09-13 RX ADMIN — METHYLPREDNISOLONE SODIUM SUCCINATE 40 MG: 40 INJECTION, POWDER, FOR SOLUTION INTRAMUSCULAR; INTRAVENOUS at 00:07

## 2018-09-13 RX ADMIN — METHYLPREDNISOLONE SODIUM SUCCINATE 40 MG: 40 INJECTION, POWDER, FOR SOLUTION INTRAMUSCULAR; INTRAVENOUS at 06:22

## 2018-09-13 RX ADMIN — INSULIN LISPRO 2 UNITS: 100 INJECTION, SOLUTION INTRAVENOUS; SUBCUTANEOUS at 08:29

## 2018-09-13 RX ADMIN — IPRATROPIUM BROMIDE AND ALBUTEROL SULFATE 3 ML: .5; 3 SOLUTION RESPIRATORY (INHALATION) at 15:07

## 2018-09-13 RX ADMIN — INSULIN LISPRO 5 UNITS: 100 INJECTION, SOLUTION INTRAVENOUS; SUBCUTANEOUS at 12:38

## 2018-09-13 RX ADMIN — METHYLPREDNISOLONE SODIUM SUCCINATE 40 MG: 40 INJECTION, POWDER, FOR SOLUTION INTRAMUSCULAR; INTRAVENOUS at 12:37

## 2018-09-13 RX ADMIN — SODIUM CHLORIDE 25 ML/HR: 900 INJECTION, SOLUTION INTRAVENOUS at 21:40

## 2018-09-13 RX ADMIN — Medication 10 ML: at 06:23

## 2018-09-13 RX ADMIN — INSULIN LISPRO 5 UNITS: 100 INJECTION, SOLUTION INTRAVENOUS; SUBCUTANEOUS at 17:07

## 2018-09-13 RX ADMIN — LEVOFLOXACIN 750 MG: 5 INJECTION, SOLUTION INTRAVENOUS at 20:50

## 2018-09-13 RX ADMIN — PERFLUTREN 1 ML: 6.52 INJECTION, SUSPENSION INTRAVENOUS at 10:25

## 2018-09-13 RX ADMIN — IPRATROPIUM BROMIDE AND ALBUTEROL SULFATE 3 ML: .5; 3 SOLUTION RESPIRATORY (INHALATION) at 04:35

## 2018-09-13 RX ADMIN — METHYLPREDNISOLONE SODIUM SUCCINATE 40 MG: 40 INJECTION, POWDER, FOR SOLUTION INTRAMUSCULAR; INTRAVENOUS at 23:28

## 2018-09-13 RX ADMIN — INSULIN LISPRO 4 UNITS: 100 INJECTION, SOLUTION INTRAVENOUS; SUBCUTANEOUS at 17:08

## 2018-09-13 RX ADMIN — METHYLPREDNISOLONE SODIUM SUCCINATE 40 MG: 40 INJECTION, POWDER, FOR SOLUTION INTRAMUSCULAR; INTRAVENOUS at 18:39

## 2018-09-13 RX ADMIN — IPRATROPIUM BROMIDE AND ALBUTEROL SULFATE 3 ML: .5; 3 SOLUTION RESPIRATORY (INHALATION) at 07:04

## 2018-09-13 NOTE — PROGRESS NOTES
Reason for Admission:   Cough, Dyspnea, COPD Exacerbation with Hypoxia RRAT Score:   Low; 8 Plan for utilizing home health:      TBD Likelihood of Readmission:  TBD Transition of Care Plan:    Physician follow up Pt admitted for hypoxia. CM met with pt at bedside to discuss transition of care. Pt is [place]. Pt lives in his [facility] and is on disability receiving $750.00. Pt has indicated he sees Kaia Bhakta NP in the community. Pt indicated he has a  with  that was assisting with housing. CM encouraged pt to call his . Pt may need home O2, however, pt did not have O2 on during interview. Please conduct a walk test to assist with determining pt need for home O2. CM continuing to follow. Care Management Interventions PCP Verified by CM: Yes Mode of Transport at Discharge: Self Transition of Care Consult (CM Consult): Discharge Planning Health Maintenance Reviewed: Yes Current Support Network: Other (homeless; lives in his [facility]) Confirm Follow Up Transport: Self Plan discussed with Pt/Family/Caregiver:  Yes

## 2018-09-13 NOTE — PROGRESS NOTES
Pt assessed. BS are coarse but clear with cough; occasional expiratory wheezing; congested productive cough. Q4 breathing txs have minimal effects. QID txs would be adequate enough. Will continue to monitor pt.  
 
Mary [place], RRT

## 2018-09-13 NOTE — ROUTINE PROCESS
Bedside and Verbal shift change report given to T. Harden Gottron, RN (oncoming nurse) by OLIVIA Bullard RN (offgoing nurse). Report included the following information SBAR, Kardex, Intake/Output and MAR.

## 2018-09-13 NOTE — ROUTINE PROCESS
TRANSFER - OUT REPORT: 
 
Verbal report given to [facility] RN(name) on Curt Matthews  being transferred to Medical (unit) for routine progression of care Report consisted of patients Situation, Background, Assessment and  
Recommendations(SBAR). Information from the following report(s) ED Summary and Recent Results was reviewed with the receiving nurse. Lines:  
Peripheral IV 09/12/18 Right Antecubital (Active) Site Assessment Clean, dry, & intact 9/12/2018  6:09 PM  
Phlebitis Assessment 0 9/12/2018  6:09 PM  
Infiltration Assessment 0 9/12/2018  6:09 PM  
Dressing Status Clean, dry, & intact 9/12/2018  6:09 PM  
Dressing Type Transparent 9/12/2018  6:09 PM  
  
 
Opportunity for questions and clarification was provided. Patient transported with: 
 Registered Nurse

## 2018-09-13 NOTE — PROGRESS NOTES
0730-received report from [address] [place] [address] included SBAR MAR and Kardex. Shift summary- no change in pt status Bedside and Verbal shift change report given to A ePri Callaway RN (oncoming nurse) by Drake Joya RN (offgoing nurse). Report included the following information SBAR, Kardex and MAR.

## 2018-09-13 NOTE — PROGRESS NOTES
Shift summary: Pt A&Ox4, up ad isaiah, voiding per urinal, denies pain, appears to be resting comfortably. No visible signs of distress.

## 2018-09-13 NOTE — DIABETES MGMT
GLYCEMIC CONTROL SCREENING INITIATED: 
 
-known h/o T2DM, HbA1c within recommended range for age + comorbids on oral home regimen 
-Solumedrol 40 mg Q 6 hours, steroid induced hyperglycemia 
-< 24 hour admission, PPG out of target range 
-recommend monitor POCT today, pt may benefit from weight based mealtime insulin while on steroids *Humalog 5 units qac Lab Results Component Value Date/Time Hemoglobin A1c 6.1 (H) 09/12/2018 06:00 PM  
  
Lab Results Component Value Date/Time Glucose 222 (H) 09/13/2018 03:00 AM  
 
 
  [x]  Glucose values exceeding inpatient target range: -180mg/dl   
        non-ICU 70-140mg/dl []  Patient meets criteria for pre-diabetes   HbA1c = 5.7-6.4% [x]  Patient has h/o diabetes HbA1c ? 6.5% 
 
  []  Recommend discontinuing oral anti-diabetic medications until discharge. []  Recommend basal insulin per IP Insulin order set. [x]  Recommend initiate meal time insulin per IP Insulin order set. *Humalog 5 units qac [x]  Recommend continue corrective insulin per IP Insulin order set. [x]  Standard insulin scale  i[]  Very insuln resistant scale 
  
  []  Patient meets criteria for IV Insulin Infusion/GlucoStabilizer order set. []  Patient has had a hypoglycemic event, recommend 
 
  [x]  Recommend blood glucose monitoring while patient is on steroids. []  Patient will need prescription for glucometer, test strips and lancets. [x]  Recommend continue carbohydrate controlled diabetic diet. []  Diabetes Self-Management class schedule provided and patient encouraged to attend. [] Other [place] Lorna MS, RN, CDE Glycemic Control Team 
[phone] Pager [phone] (M-TH 8:30-5P) *After Hours pager [phone]

## 2018-09-13 NOTE — PROGRESS NOTES
Hospitalist Progress Note Patient: Adelaida Duvall MRN: [id number]  CSN: [id number] YOB: 1960  Age: 60 y.o. Sex: male DOA: 9/12/2018 LOS:  LOS: 1 day Chief Complaint: 
 
Cough, Dyspnea Assessment/Plan 1. COPD Exacerbation with Hypoxia 2. DM 
3. Hypernatremia - Resolved 1. Continue with supplemental oxygen at this time, attempt to wean. Patient may require home O2, will need walk test. Difficult disposition as patient is homeless and may require O2. Case management aware. Continue with empiric IV abx, IV steroids, neb treatments as needed. 2. Adjusted patient's insulin regimen per recommendations of glycemic control as patient is on IV steroids. Order placed for 5 units of Humalog at meals with SSI. 3. Continue to monitor with BMP. CXR on admission showed pulmonary edema, thus prompting an echocardiogram. The echo has been completed, awaiting reading. DVT Prophylaxis - Heparin Disposition - 2-3 days Patient Active Problem List  
Diagnosis Code  Hypoxia R09.02  
 COPD (chronic obstructive pulmonary disease) ([facility]) J44.9  Hypoxemia R09.02  
 Hypernatremia E87.0  DM (diabetes mellitus) ([facility]) E11.9  
 COPD (chronic obstructive pulmonary disease) with acute bronchitis ([facility]) J44.0, J20.9 Subjective: 
 
Wants to go home Review of systems: 
 
Constitutional: denies fevers, chills, myalgias Respiratory: +SOB, +cough Cardiovascular: denies chest pain, palpitations Gastrointestinal: denies nausea, vomiting, diarrhea Vital signs/Intake and Output: 
Visit Vitals  /66 (BP 1 Location: Left arm, BP Patient Position: At rest)  Pulse 73  Temp 97.9 °F (36.6 °C)  Resp 20  
 Ht 5' 11\" (1.803 m)  Wt 113.9 kg (251 lb)  SpO2 92%  BMI 35.01 kg/m2 Current Shift:  09/13 0701 - 09/13 1900 In: 480 [P.O.:480] Out: 500 [Urine:500] Last three shifts:  09/11 1901 - 09/13 0700 In: -  
Out: 700 [Urine:700] Exam: General: Obese male, alert, NAD, OX3 Head/Neck: NCAT, supple, No masses, No lymphadenopathy CVS:Regular rate and rhythm, no M/R/G, S1/S2 heard, no thrill Lungs: Inspiratory/expiratory wheezing bilaterally Abdomen: Soft, Nontender, No distention, Normal Bowel sounds, No hepatomegaly Extremities: No C/C/E, pulses palpable 2+ Skin:normal texture and turgor, no rashes, no lesions Neuro:grossly normal , follows commands Psych:appropriate Labs: Results:  
   
Chemistry Recent Labs  
   09/13/18 
 0300  09/12/18 
 1800 GLU  222*  125* NA  145  147* K  4.6  4.0  
CL  107  107 CO2  29  33* BUN  18  19* CREA  0.95  1.02  
CA  8.6  8.7 AGAP  9  7 BUCR  19  19 AP   --   61  
TP   --   6.7 ALB   --   3.1*  
GLOB   --   3.6 AGRAT   --   0.9 CBC w/Diff Recent Labs  
   09/13/18 
 0300  09/12/18 
 1800 WBC  5.8  5.9  
RBC  4.78  4.73 HGB  15.4  15.5 HCT  46.5  45.7 PLT  240  244 GRANS  88*  55  
LYMPH  11*  33 EOS  0  3 Cardiac Enzymes Recent Labs  
   09/13/18 
 0912  09/13/18 
 0300 CPK  99  89 CKND1  CALCULATION NOT PERFORMED WHEN RESULT IS BELOW LINEAR LIMIT  CALCULATION NOT PERFORMED WHEN RESULT IS BELOW LINEAR LIMIT Coagulation No results for input(s): PTP, INR, APTT in the last 72 hours. No lab exists for component: INREXT Lipid Panel No results found for: CHOL, CHOLPOCT, CHOLX, CHLST, CHOLV, 404400, HDL, LDL, LDLC, DLDLP, 612460, VLDLC, VLDL, TGLX, TRIGL, TRIGP, TGLPOCT, CHHD, CHHDX  
BNP No results for input(s): BNPP in the last 72 hours. Liver Enzymes Recent Labs  
   09/12/18 
 1800 TP  6.7 ALB  3.1* AP  61 SGOT  22 Thyroid Studies No results found for: T4, T3U, TSH, TSHEXT Procedures/imaging: see electronic medical records for all procedures/Xrays and details which were not copied into this note but were reviewed prior to creation of Plan Jay Jay Barfield PA-C

## 2018-09-13 NOTE — PROGRESS NOTES
ORDER FROM DR. CAMARILLO FOR INPATIENT SLEEP STUDY NOTED, BUT WE DO NOT DO SLEEP STUDIES AT THE [facility]. KATHERINE MOORE RN, NOTIFIED.

## 2018-09-14 VITALS
RESPIRATION RATE: 20 BRPM | TEMPERATURE: 98.3 F | HEIGHT: 71 IN | BODY MASS INDEX: 35.28 KG/M2 | WEIGHT: 251.99 LBS | DIASTOLIC BLOOD PRESSURE: 55 MMHG | OXYGEN SATURATION: 97 % | SYSTOLIC BLOOD PRESSURE: 112 MMHG | HEART RATE: 67 BPM

## 2018-09-14 LAB
ANION GAP SERPL CALC-SCNC: 8 MMOL/L (ref 3–18)
BASOPHILS # BLD: 0 K/UL (ref 0–0.1)
BASOPHILS NFR BLD: 0 % (ref 0–2)
BUN SERPL-MCNC: 16 MG/DL (ref 7–18)
BUN/CREAT SERPL: 17 (ref 12–20)
CALCIUM SERPL-MCNC: 8.7 MG/DL (ref 8.5–10.1)
CHLORIDE SERPL-SCNC: 108 MMOL/L (ref 100–108)
CO2 SERPL-SCNC: 27 MMOL/L (ref 21–32)
CREAT SERPL-MCNC: 0.92 MG/DL (ref 0.6–1.3)
DIFFERENTIAL METHOD BLD: ABNORMAL
EOSINOPHIL # BLD: 0 K/UL (ref 0–0.4)
EOSINOPHIL NFR BLD: 0 % (ref 0–5)
ERYTHROCYTE [DISTWIDTH] IN BLOOD BY AUTOMATED COUNT: 13.3 % (ref 11.6–14.5)
GLUCOSE BLD STRIP.AUTO-MCNC: 152 MG/DL (ref 70–110)
GLUCOSE BLD STRIP.AUTO-MCNC: 259 MG/DL (ref 70–110)
GLUCOSE SERPL-MCNC: 176 MG/DL (ref 74–99)
HCT VFR BLD AUTO: 44.9 % (ref 36–48)
HGB BLD-MCNC: 15.3 G/DL (ref 13–16)
LYMPHOCYTES # BLD: 0.9 K/UL (ref 0.9–3.6)
LYMPHOCYTES NFR BLD: 6 % (ref 21–52)
MCH RBC QN AUTO: 32.9 PG (ref 24–34)
MCHC RBC AUTO-ENTMCNC: 34.1 G/DL (ref 31–37)
MCV RBC AUTO: 96.6 FL (ref 74–97)
MONOCYTES # BLD: 0.5 K/UL (ref 0.05–1.2)
MONOCYTES NFR BLD: 3 % (ref 3–10)
NEUTS SEG # BLD: 14.2 K/UL (ref 1.8–8)
NEUTS SEG NFR BLD: 91 % (ref 40–73)
PLATELET # BLD AUTO: 253 K/UL (ref 135–420)
PMV BLD AUTO: 10.4 FL (ref 9.2–11.8)
POTASSIUM SERPL-SCNC: 4.3 MMOL/L (ref 3.5–5.5)
RBC # BLD AUTO: 4.65 M/UL (ref 4.7–5.5)
SODIUM SERPL-SCNC: 143 MMOL/L (ref 136–145)
WBC # BLD AUTO: 15.5 K/UL (ref 4.6–13.2)

## 2018-09-14 PROCEDURE — 97161 PT EVAL LOW COMPLEX 20 MIN: CPT

## 2018-09-14 PROCEDURE — 74011250636 HC RX REV CODE- 250/636: Performed by: PHYSICIAN ASSISTANT

## 2018-09-14 PROCEDURE — 94640 AIRWAY INHALATION TREATMENT: CPT

## 2018-09-14 PROCEDURE — 97116 GAIT TRAINING THERAPY: CPT

## 2018-09-14 PROCEDURE — 94760 N-INVAS EAR/PLS OXIMETRY 1: CPT

## 2018-09-14 PROCEDURE — 82962 GLUCOSE BLOOD TEST: CPT

## 2018-09-14 PROCEDURE — 74011636637 HC RX REV CODE- 636/637: Performed by: PHYSICIAN ASSISTANT

## 2018-09-14 PROCEDURE — 77010033678 HC OXYGEN DAILY

## 2018-09-14 PROCEDURE — 74011250636 HC RX REV CODE- 250/636: Performed by: INTERNAL MEDICINE

## 2018-09-14 PROCEDURE — 74011000250 HC RX REV CODE- 250: Performed by: INTERNAL MEDICINE

## 2018-09-14 PROCEDURE — 80048 BASIC METABOLIC PNL TOTAL CA: CPT | Performed by: PHYSICIAN ASSISTANT

## 2018-09-14 PROCEDURE — 36415 COLL VENOUS BLD VENIPUNCTURE: CPT | Performed by: PHYSICIAN ASSISTANT

## 2018-09-14 PROCEDURE — 74011250637 HC RX REV CODE- 250/637: Performed by: HOSPITALIST

## 2018-09-14 PROCEDURE — 74011636637 HC RX REV CODE- 636/637: Performed by: INTERNAL MEDICINE

## 2018-09-14 PROCEDURE — 85025 COMPLETE CBC W/AUTO DIFF WBC: CPT | Performed by: PHYSICIAN ASSISTANT

## 2018-09-14 RX ORDER — FLUTICASONE PROPIONATE AND SALMETEROL 250; 50 UG/1; UG/1
1 POWDER RESPIRATORY (INHALATION) EVERY 12 HOURS
COMMUNITY

## 2018-09-14 RX ORDER — ROSUVASTATIN CALCIUM 10 MG/1
40 TABLET, COATED ORAL
Status: DISCONTINUED | OUTPATIENT
Start: 2018-09-14 | End: 2018-09-14 | Stop reason: HOSPADM

## 2018-09-14 RX ORDER — TRAZODONE HYDROCHLORIDE 50 MG/1
50 TABLET ORAL
COMMUNITY

## 2018-09-14 RX ORDER — PREDNISONE 10 MG/1
TABLET ORAL
Qty: 20 TAB | Refills: 0 | Status: SHIPPED | OUTPATIENT
Start: 2018-09-14 | End: 2018-10-12

## 2018-09-14 RX ORDER — BUPROPION HYDROCHLORIDE 150 MG/1
150 TABLET, EXTENDED RELEASE ORAL 2 TIMES DAILY
Status: DISCONTINUED | OUTPATIENT
Start: 2018-09-14 | End: 2018-09-14 | Stop reason: HOSPADM

## 2018-09-14 RX ORDER — LEVOFLOXACIN 750 MG/1
750 TABLET ORAL DAILY
Qty: 5 TAB | Refills: 0 | Status: SHIPPED | OUTPATIENT
Start: 2018-09-14 | End: 2018-10-12

## 2018-09-14 RX ORDER — GLIPIZIDE 10 MG/1
10 TABLET ORAL DAILY
COMMUNITY

## 2018-09-14 RX ORDER — ALBUTEROL SULFATE 90 UG/1
2 AEROSOL, METERED RESPIRATORY (INHALATION)
COMMUNITY

## 2018-09-14 RX ORDER — BUPROPION HYDROCHLORIDE 150 MG/1
150 TABLET, EXTENDED RELEASE ORAL 2 TIMES DAILY
COMMUNITY

## 2018-09-14 RX ORDER — ROSUVASTATIN CALCIUM 40 MG/1
40 TABLET, COATED ORAL
COMMUNITY

## 2018-09-14 RX ORDER — FLUTICASONE FUROATE AND VILANTEROL 200; 25 UG/1; UG/1
1 POWDER RESPIRATORY (INHALATION) DAILY
Status: DISCONTINUED | OUTPATIENT
Start: 2018-09-14 | End: 2018-09-14 | Stop reason: HOSPADM

## 2018-09-14 RX ORDER — ALBUTEROL SULFATE 90 UG/1
2 AEROSOL, METERED RESPIRATORY (INHALATION)
Status: DISCONTINUED | OUTPATIENT
Start: 2018-09-14 | End: 2018-09-14 | Stop reason: HOSPADM

## 2018-09-14 RX ADMIN — HEPARIN SODIUM 5000 UNITS: 5000 INJECTION, SOLUTION INTRAVENOUS; SUBCUTANEOUS at 04:06

## 2018-09-14 RX ADMIN — INSULIN LISPRO 5 UNITS: 100 INJECTION, SOLUTION INTRAVENOUS; SUBCUTANEOUS at 14:05

## 2018-09-14 RX ADMIN — INSULIN LISPRO 2 UNITS: 100 INJECTION, SOLUTION INTRAVENOUS; SUBCUTANEOUS at 08:48

## 2018-09-14 RX ADMIN — INSULIN LISPRO 4 UNITS: 100 INJECTION, SOLUTION INTRAVENOUS; SUBCUTANEOUS at 14:05

## 2018-09-14 RX ADMIN — IPRATROPIUM BROMIDE AND ALBUTEROL SULFATE 3 ML: .5; 3 SOLUTION RESPIRATORY (INHALATION) at 05:01

## 2018-09-14 RX ADMIN — IPRATROPIUM BROMIDE AND ALBUTEROL SULFATE 3 ML: .5; 3 SOLUTION RESPIRATORY (INHALATION) at 00:42

## 2018-09-14 RX ADMIN — IPRATROPIUM BROMIDE AND ALBUTEROL SULFATE 3 ML: .5; 3 SOLUTION RESPIRATORY (INHALATION) at 07:18

## 2018-09-14 RX ADMIN — METHYLPREDNISOLONE SODIUM SUCCINATE 40 MG: 40 INJECTION, POWDER, FOR SOLUTION INTRAMUSCULAR; INTRAVENOUS at 07:13

## 2018-09-14 RX ADMIN — HEPARIN SODIUM 5000 UNITS: 5000 INJECTION, SOLUTION INTRAVENOUS; SUBCUTANEOUS at 14:04

## 2018-09-14 RX ADMIN — Medication 10 ML: at 07:16

## 2018-09-14 RX ADMIN — INSULIN LISPRO 5 UNITS: 100 INJECTION, SOLUTION INTRAVENOUS; SUBCUTANEOUS at 08:48

## 2018-09-14 RX ADMIN — METHYLPREDNISOLONE SODIUM SUCCINATE 40 MG: 40 INJECTION, POWDER, FOR SOLUTION INTRAMUSCULAR; INTRAVENOUS at 14:04

## 2018-09-14 RX ADMIN — IPRATROPIUM BROMIDE AND ALBUTEROL SULFATE 3 ML: .5; 3 SOLUTION RESPIRATORY (INHALATION) at 11:24

## 2018-09-14 RX ADMIN — FUROSEMIDE 10 MG: 20 TABLET ORAL at 08:47

## 2018-09-14 NOTE — DISCHARGE INSTRUCTIONS
Learning About ARBs  Introduction    ARBs (angiotensin II receptor blockers) block a hormone that makes blood vessels narrow. As a result, the blood vessels relax and widen. This lowers blood pressure. ARBs also put more water and salt into the urine. This also lowers blood pressure. ARBs can treat:  · High blood pressure. · Coronary artery disease. · Heart failure. They also may be used to help your kidneys when you have diabetes. Examples  ARBs include:  · Candesartan (Atacand). · Irbesartan (Avapro). · Losartan (Cozaar). This is not a complete list of all ARBs. Possible side effects  Side effects may include:  · Low blood pressure. You may feel dizzy and weak. · Diarrhea. · High potassium levels. You may have other side effects or reactions not listed here. Check the information that comes with your medicine. What to know about taking this medicine  · ARBs may be used if you had a cough when you tried to take an ACE inhibitor. ARBs are less likely to cause a cough. · You may need regular blood tests. · Take your medicines exactly as prescribed. Call your doctor if you think you are having a problem with your medicine. · Tell your doctor or pharmacist all the medicines you take. This includes over-the-counter medicines, vitamins, herbal products, and supplements. Taking some medicines together can cause problems. · You should not take ARBs if you are pregnant or planning to become pregnant. Where can you learn more? Go to http://huber-richelle.info/. Enter K212 in the search box to learn more about \"Learning About ARBs. \"  Current as of: December 6, 2017  Content Version: 11.7  © 4666-6032 "[employer]. Care instructions adapted under license by [employer] (which disclaims liability or warranty for this information).  If you have questions about a medical condition or this instruction, always ask your healthcare professional. Beata Grossman disclaims any warranty or liability for your use of this information.

## 2018-09-14 NOTE — PROGRESS NOTES
Shift Summary :  Slept most of shift without complaints. Voiding. Telemetry continued. Oxygen at 1.5 L in use.

## 2018-09-14 NOTE — PROGRESS NOTES
0725-received report from A Abelardo RN included SBAR MAR and Farzaneh. 1422-discharged to home, instructions reviewed and understood by pt.

## 2018-09-14 NOTE — DISCHARGE SUMMARY
Discharge Summary    Patient: Leidy Cotton MRN: [id number]  CSN: [id number]    YOB: 1960  Age: 60 y.o. Sex: male    DOA: 9/12/2018 LOS:  LOS: 2 days   Discharge Date:      Primary Care Provider:  Judy Mast MD    Admission Diagnoses: Hypoxia  COPD (chronic obstructive pulmonary disease) with acute bronchitis (HCC)  Hypoxemia  DM (diabetes mellitus) ([facility]  Hypernatremia    Discharge Diagnoses:    Problem List as of 9/14/2018  Date Reviewed: 9/12/2018          Codes Class Noted - Resolved    * (Principal)Hypoxia ICD-10-CM: R09.02  ICD-9-CM: 799.02  9/12/2018 - Present        COPD (chronic obstructive pulmonary disease) ([facility] ICD-10-CM: J44.9  ICD-9-CM: 320  9/12/2018 - Present        Hypoxemia ICD-10-CM: R09.02  ICD-9-CM: 799.02  9/12/2018 - Present        Hypernatremia ICD-10-CM: E87.0  ICD-9-CM: 276.0  9/12/2018 - Present        DM (diabetes mellitus) ([facility] ICD-10-CM: E11.9  ICD-9-CM: 250.00  9/12/2018 - Present        COPD (chronic obstructive pulmonary disease) with acute bronchitis ([facility] ICD-10-CM: J44.0, J20.9  ICD-9-CM: 491.22  9/12/2018 - Present              Discharge Medications:     Current Discharge Medication List      START taking these medications    Details   levoFLOXacin (LEVAQUIN) 750 mg tablet Take 1 Tab by mouth daily. Qty: 5 Tab, Refills: 0      predniSONE (DELTASONE) 10 mg tablet Days 1 & 2: Take FOUR tabs. Days 3 & 4: Take THREE tabs. Days 5 & 6: Take TWO tabs. Days 7 & 8: Take ONE tab. Qty: 20 Tab, Refills: 0         CONTINUE these medications which have NOT CHANGED    Details   fluticasone-salmeterol (ADVAIR DISKUS) 250-50 mcg/dose diskus inhaler Take 1 Puff by inhalation every twelve (12) hours. rosuvastatin (CRESTOR) 40 mg tablet Take 40 mg by mouth nightly. glipiZIDE (GLUCOTROL) 10 mg tablet Take 10 mg by mouth daily. buPROPion SR (WELLBUTRIN SR) 150 mg SR tablet Take 150 mg by mouth two (2) times a day.       traZODone (DESYREL) 50 mg tablet Take 50 mg by mouth nightly. albuterol (PROAIR HFA) 90 mcg/actuation inhaler Take 2 Puffs by inhalation every six (6) hours as needed for Wheezing. tiotropium (SPIRIVA WITH HANDIHALER) 18 mcg inhalation capsule Take 1 Cap by inhalation daily. Discharge Condition: Stable    Procedures : None    Consults: None      PHYSICAL EXAM    Visit Vitals    /55    Pulse 67    Temp 98.3 °F (36.8 °C)    Resp 20    Ht 5' 11\" (1.803 m)    Wt 114.3 kg (251 lb 15.8 oz)    SpO2 97%    BMI 35.14 kg/m2     General: Awake, cooperative, no acute distress    HEENT: NC, Atraumatic. PERRLA, EOMI. Anicteric sclerae. Lungs:  CTA Bilaterally. No Wheezing/Rhonchi/Rales. Heart:  Regular  rhythm,  No murmur, No Rubs, No Gallops  Abdomen: Soft, Non distended, Non tender. +Bowel sounds,   Extremities: No c/c/e  Psych:   Not anxious or agitated. Neurologic:  No acute neurological deficits. Admission HPI : Lawson Cantu is a 60 y.o.  male who has hx of COPD, DM , Sleep Apnea   Presents to ER with coughing spells and worsening shortness of breath for the last four days   Patient was on oxygen in past which was lost when he became homeless currently living in a mobile [facility]  States has chronic dyspnea followed by NP in [place] for Advair refills ect. .  Continues  to smoke half a pack day but lately having trouble inhaling full cigarette and has had to cut back due to worsening shortness of breath . In ER had episodes of hypoxemia down to 88 86 percent on RA despite  Being treated with Duo neb, Solumedrol ect. .. Hospital Course : This patient was admitted to medical services on September 12, 2018 for a COPD exacerbation. Other admission diagnoses include DM and ENRIKE not on CPAP. COPD Exacerbation  This patient was admitted to the medical floor for continued care.  He was placed on supplemental oxygen, started on empiric IV Levaquin, IV solumedrol, and given priscilla prn. The patient was slowly weaned off of supplemental oxygen prior to discharge. He did undergo a walk test, in which he desaturated only with physical activity. With rest, his oxygen saturation levels rapidly returned to satisfactory levels. The patient was given the option of obtaining oxygen, which he declined, stating he wanted to follow up with his PCP to pursue this if necessary. The patient will be discharged on his usual inhalers, with a continued course of Levaquin for 5 more days, and a steroid taper. He was advised to follow up with his PCP upon discharge. DM  The patient was placed on a diabetic diet and his blood sugars were monitored. The patient was initially placed on sliding scale insulin, but required a larger amount, and was then placed on 5 units of Humalog with meals in addition to the sliding scale. He was advised to resume his usual medications, and follow up with his PCP. The patient worked with physical therapy prior to discharge, who did not identify any needs at the time of discharge. As documented above, the patient underwent a walk test, in which he desaturated only during physical activity. He will be discharged home today in stable condition.       Activity: Activity as tolerated    Diet: Diabetic Diet    Follow-up: PCP    Disposition: Home    Minutes spent on discharge: 35       Labs: Results:       Chemistry Recent Labs      09/14/18 0805 09/13/18   0300  09/12/18   1800   GLU  176*  222*  125*   NA  143  145  147*   K  4.3  4.6  4.0   CL  108  107  107   CO2  27  29  33*   BUN  16  18  19*   CREA  0.92  0.95  1.02   CA  8.7  8.6  8.7   AGAP  8  9  7   BUCR  17  19  19   AP   --    --   61   TP   --    --   6.7   ALB   --    --   3.1*   GLOB   --    --   3.6   AGRAT   --    --   0.9      CBC w/Diff Recent Labs      09/14/18   0805 09/13/18   0300  09/12/18   1800   WBC  15.5*  5.8  5.9   RBC  4.65*  4.78  4.73   HGB  15.3  15.4  15.5   HCT  44.9  46.5  45.7   PLT 253  240  244   GRANS  91*  88*  55   LYMPH  6*  11*  33   EOS  0  0  3      Cardiac Enzymes Recent Labs      09/13/18   0912  09/13/18   0300   CPK  99  89   CKND1  CALCULATION NOT PERFORMED WHEN RESULT IS BELOW LINEAR LIMIT  CALCULATION NOT PERFORMED WHEN RESULT IS BELOW LINEAR LIMIT      Coagulation No results for input(s): PTP, INR, APTT in the last 72 hours. No lab exists for component: INREXT    Lipid Panel No results found for: CHOL, CHOLPOCT, CHOLX, CHLST, CHOLV, 967823, HDL, LDL, LDLC, DLDLP, 900630, VLDLC, VLDL, TGLX, TRIGL, TRIGP, TGLPOCT, CHHD, CHHDX   BNP No results for input(s): BNPP in the last 72 hours. Liver Enzymes Recent Labs      09/12/18   1800   TP  6.7   ALB  3.1*   AP  61   SGOT  22      Thyroid Studies No results found for: T4, T3U, TSH, TSHEXT         Significant Diagnostic Studies: Xr Chest Port    Result Date: 9/12/2018  EXAM: XR CHEST PORT CLINICAL HISTORY: cough. -Additional: None. TECHNIQUE: A portable erect AP radiographic view of the chest is reviewed without comparison. _______________ FINDINGS: There is diffuse vascular and interstitial prominence, consistent with mild pulmonary edema. The pleural spaces are clear. The cardiac silhouette, maya, and mediastinal contours are normal for age. No acute osseous abnormality is revealed. _______________     IMPRESSION: Findings of mild pulmonary edema. _______________        No results found for this or any previous visit.         CC: Judy Mast MD

## 2018-09-14 NOTE — PROGRESS NOTES
D/C Plan:  Physician follow up 9/14/18 Noted pt's O2 sats briefly dropped to 85%. CM met with pt at bedside to discuss transition of care and need for home O2. Pt has concerns with having home O2 given he his living in his [facility]. Pt would prefer to not have home O2 and follow up with his PCP given she has been able to prevent him form using home O2. Pt would like to be discharged today. Pt has been cleared by therapy and no HH/DME needs have been identified. Pt's [facility] is in the parking lot and pt will drive him self to his friend's home. No other transition of care needs have been identified. Care Management Interventions PCP Verified by CM: Yes Mode of Transport at Discharge: Self Transition of Care Consult (CM Consult): Discharge Planning Health Maintenance Reviewed: Yes Current Support Network: Other (homeless; lives in his [facility]) Confirm Follow Up Transport: Self Plan discussed with Pt/Family/Caregiver:  Yes

## 2018-09-14 NOTE — PROGRESS NOTES
Problem: Mobility Impaired (Adult and Pediatric) Goal: *Acute Goals and Plan of Care (Insert Text) No goals necessary at this time as pt demonstrates functional mobility with independence. physical Therapy EVALUATION & Discharge Patient: Adelaida Duvall (67 y.o. male) Date: 9/14/2018 Primary Diagnosis: Hypoxia COPD (chronic obstructive pulmonary disease) with acute bronchitis ([facility] Hypoxemia DM (diabetes mellitus) ([facility] Hypernatremia Precautions:  
 
ASSESSMENT AND RECOMMENDATIONS: 
Based on the objective data described below, the patient presents with ability to perform functional mobility tasks with independence. Currently on O2 at 1Lnc and same in place throughout session. Pt demonstrates bed mobility, use of bathroom and transfers/ambulation 400ft without AD with independence. O2 sat 97% pre gt; 96% post gt. Pt re turned to room and left up in chair in NAD with all needs in reach. Note occasional coughing during session (pt is smoker). Respiratory therapist present at completion of session. Note pt reports living in his [employer] which is parked at some friend's home in the [facility]. Care manager is aware. Skilled physical therapy is not indicated at this time. Discharge Recommendations: None Patient Education: pacing activity d/t h/o COPD Further Equipment Recommendations for Discharge: N/A   
 
 
SUBJECTIVE:  
Patient stated I can walk.  OBJECTIVE DATA SUMMARY:  
 
Past Medical History:  
Diagnosis Date  COPD (chronic obstructive pulmonary disease) (HCC)  Diabetes ([facility]  Emphysema lung ([facility]  Hernia, abdominal   
 Hypercholesteremia Past Surgical History:  
Procedure Laterality Date  HX BACK SURGERY Barriers to Learning/Limitations: None Compensate with: N/A Prior Level of Function/Home Situation: independent ambulation; lives in [employer] which is parked at some friend's home. Home Situation Home Environment: Other (comment) (pt is homeless and lives in his mobile [place]) One/Two Story Residence: Other (Comment) Living Alone: Yes Support Systems: None Patient Expects to be Discharged to[de-identified] Unknown Current DME Used/Available at Home: Nebulizer (Unable to use at the moment) Critical Behavior: 
Neurologic State: Alert; Appropriate for age Orientation Level: Oriented X4 Cognition: Follows commands; Appropriate safety awareness; Appropriate for age attention/concentration Safety/Judgement: Awareness of environment Psychosocial 
Patient Behaviors: Calm; Cooperative Purposeful Interaction: Yes Pt Identified Daily Priority: Clinical issues (comment) Car[facility] Process: Create healing environment; Teaching/learning Caring Interventions: Reassure Reassure: Informing;Caring rounds Skin Condition/Temp: Dry;Warm 
Skin Integrity: Intact Skin Integumentary Skin Color: Appropriate for ethnicity;Vinny Skin Condition/Temp: Dry;Warm 
Skin Integrity: Intact Turgor: Non-tenting Strength:   
Strength: Within functional limits Tone & Sensation:  
Sensation: Intact Range Of Motion: 
AROM: Within functional limits Functional Mobility: 
Bed Mobility: 
Rolling: Independent Supine to Sit: Independent Transfers: 
Sit to Stand: Independent Stand to Sit: Independent Bed to Chair: Independent Balance:  
Sitting: Intact Standing: Intact Ambulation/Gait Training: 
Distance (ft): 400 Feet (ft) Assistive Device: Gait belt Ambulation - Level of Assistance: Independent (with portable O2) Gait Description (WDL): Exceptions to [facility] Pain: 
Pain Scale 1: Numeric (0 - 10) Pain Intensity 1: 0 Activity Tolerance:  
Good Please refer to the flowsheet for vital signs taken during this treatment. After treatment:  
[x]         Patient left in no apparent distress sitting up in chair 
[]         Patient left in no apparent distress in bed 
[x]         Call bell left within reach [x]         Nursing notified-Jim []         Caregiver present 
[]         Bed alarm activated COMMUNICATION/EDUCATION:  
[]         Fall prevention education was provided and the patient/caregiver indicated understanding. []         Patient/family have participated as able in goal setting and plan of care. []         Patient/family agree to work toward stated goals and plan of care. []         Patient understands intent and goals of therapy, but is neutral about his/her participation. []         Patient is unable to participate in goal setting and plan of care. Eval Complexity: History: HIGH Complexity :3+ comorbidities / personal factors will impact the outcome/ POC Exam:LOW Complexity : 1-2 Standardized tests and measures addressing body structure, function, activity limitation and / or participation in recreation  Presentation: LOW Complexity : Stable, uncomplicated  Clinical Decision Making:Low Complexity amb 400ft with independence (O2 in place 1Lnc at this time) Overall Complexity:LOW Thank you for this referral. 
Savannah Albert, PT Time Calculation: 29 mins

## 2018-09-14 NOTE — PROGRESS NOTES
Dual AVS reviewed with DELON Fernandez. All medications reviewed individually with patient. Opportunities for questions and concerns provided. Patient discharged via [facility] to his car parked at the ER parking lot. Patient's arm band removed and appropriately discarded.

## 2018-09-14 NOTE — ROUTINE PROCESS
Bedside and Verbal shift change report given to JOE Snider RN  (oncoming nurse) by  OLIVIA Zhou RN  (offgoing nurse). Report included the following information SBAR, Kardex, Recent Results and Med Rec Status.

## 2018-09-14 NOTE — PROGRESS NOTES
Admission Medication Reconciliation has been performed on this patient consisting of interview of the patient regarding their PTA Home Medication List, Allergies and PMH as well as obtaining outpatient pharmacy information. Interviewed patient who was not a good historian. Patient did not provide a written list of home medications. Patient's outpatient pharmacy is [facility] on [address], but up until last month it was [facility] [phone]. Medications are managed by self PAML was  marked complete and did  require modification. Admission orders have  already been written. Medication Compliance Issues and/or Medication Concerns:  
 
1. Spoke w/ Arnaud Toribio at [facility] [phone] who reported 7 current meds. He also confirmed RX's recently transferred to [facility].  Current PAML was marked complete and contained no entries. Pt reported to me that although he lives in his [facility] he is compliant with his meds and last picked up refills ~ 2 weeks ago. Added to the PAML: avair, proair, spiriva, crestor, trasodone, wellbutrin SR and glipizide. Verbally communicated to Dr. Andria [facility]. Pt being discharged today. Ester Doyle [facility] Clinical Pharmacist 
[phone]

## 2018-09-15 LAB
BACTERIA SPEC CULT: NORMAL
GRAM STN SPEC: NORMAL
SERVICE CMNT-IMP: NORMAL

## 2018-09-18 LAB
BACTERIA SPEC CULT: NORMAL
BACTERIA SPEC CULT: NORMAL
SERVICE CMNT-IMP: NORMAL
SERVICE CMNT-IMP: NORMAL

## 2018-10-12 ENCOUNTER — APPOINTMENT (OUTPATIENT)
Dept: GENERAL RADIOLOGY | Age: 58
DRG: 139 | End: 2018-10-12
Attending: EMERGENCY MEDICINE
Payer: MEDICAID

## 2018-10-12 ENCOUNTER — APPOINTMENT (OUTPATIENT)
Dept: NON INVASIVE DIAGNOSTICS | Age: 58
DRG: 139 | End: 2018-10-12
Attending: HOSPITALIST
Payer: MEDICAID

## 2018-10-12 ENCOUNTER — HOSPITAL ENCOUNTER (INPATIENT)
Age: 58
LOS: 3 days | Discharge: HOME | DRG: 139 | End: 2018-10-15
Attending: EMERGENCY MEDICINE | Admitting: HOSPITALIST
Payer: MEDICAID

## 2018-10-12 DIAGNOSIS — J44.1 ACUTE EXACERBATION OF CHRONIC OBSTRUCTIVE PULMONARY DISEASE (COPD) (HCC): ICD-10-CM

## 2018-10-12 DIAGNOSIS — J18.9 COMMUNITY ACQUIRED PNEUMONIA OF RIGHT LOWER LOBE OF LUNG: Primary | ICD-10-CM

## 2018-10-12 DIAGNOSIS — R09.02 HYPOXIA: ICD-10-CM

## 2018-10-12 PROBLEM — R07.9 CHEST PAIN: Status: ACTIVE | Noted: 2018-10-12

## 2018-10-12 PROBLEM — F17.200 SMOKER: Status: ACTIVE | Noted: 2018-10-12

## 2018-10-12 PROBLEM — J43.9 EMPHYSEMA LUNG (HCC): Status: ACTIVE | Noted: 2018-10-12

## 2018-10-12 PROBLEM — E66.9 OBESITY: Status: ACTIVE | Noted: 2018-10-12

## 2018-10-12 PROBLEM — H91.90 HARD OF HEARING: Status: ACTIVE | Noted: 2018-10-12

## 2018-10-12 LAB
ALBUMIN SERPL-MCNC: 3.3 G/DL (ref 3.4–5)
ALBUMIN/GLOB SERPL: 0.7 {RATIO} (ref 0.8–1.7)
ALP SERPL-CCNC: 76 U/L (ref 45–117)
ALT SERPL-CCNC: 39 U/L (ref 16–61)
ANION GAP SERPL CALC-SCNC: 9 MMOL/L (ref 3–18)
AST SERPL-CCNC: 19 U/L (ref 15–37)
BASOPHILS # BLD: 0 K/UL (ref 0–0.1)
BASOPHILS NFR BLD: 0 % (ref 0–2)
BILIRUB SERPL-MCNC: 0.4 MG/DL (ref 0.2–1)
BUN SERPL-MCNC: 20 MG/DL (ref 7–18)
BUN/CREAT SERPL: 20 (ref 12–20)
CALCIUM SERPL-MCNC: 9.2 MG/DL (ref 8.5–10.1)
CHLORIDE SERPL-SCNC: 103 MMOL/L (ref 100–108)
CK MB CFR SERPL CALC: NORMAL % (ref 0–4)
CK MB CFR SERPL CALC: NORMAL % (ref 0–4)
CK MB SERPL-MCNC: <1 NG/ML (ref 5–25)
CK MB SERPL-MCNC: <1 NG/ML (ref 5–25)
CK SERPL-CCNC: 100 U/L (ref 39–308)
CK SERPL-CCNC: 103 U/L (ref 39–308)
CO2 SERPL-SCNC: 30 MMOL/L (ref 21–32)
CREAT SERPL-MCNC: 1.02 MG/DL (ref 0.6–1.3)
DIFFERENTIAL METHOD BLD: ABNORMAL
EOSINOPHIL # BLD: 0.1 K/UL (ref 0–0.4)
EOSINOPHIL NFR BLD: 1 % (ref 0–5)
ERYTHROCYTE [DISTWIDTH] IN BLOOD BY AUTOMATED COUNT: 13.3 % (ref 11.6–14.5)
EST. AVERAGE GLUCOSE BLD GHB EST-MCNC: 131 MG/DL
GLOBULIN SER CALC-MCNC: 4.6 G/DL (ref 2–4)
GLUCOSE BLD STRIP.AUTO-MCNC: 219 MG/DL (ref 70–110)
GLUCOSE BLD STRIP.AUTO-MCNC: 239 MG/DL (ref 70–110)
GLUCOSE SERPL-MCNC: 89 MG/DL (ref 74–99)
HBA1C MFR BLD: 6.2 % (ref 4.5–5.6)
HCT VFR BLD AUTO: 46.9 % (ref 36–48)
HGB BLD-MCNC: 15.9 G/DL (ref 13–16)
LYMPHOCYTES # BLD: 2.1 K/UL (ref 0.9–3.6)
LYMPHOCYTES NFR BLD: 20 % (ref 21–52)
MCH RBC QN AUTO: 32.9 PG (ref 24–34)
MCHC RBC AUTO-ENTMCNC: 33.9 G/DL (ref 31–37)
MCV RBC AUTO: 97.1 FL (ref 74–97)
MONOCYTES # BLD: 0.9 K/UL (ref 0.05–1.2)
MONOCYTES NFR BLD: 8 % (ref 3–10)
NEUTS SEG # BLD: 7.4 K/UL (ref 1.8–8)
NEUTS SEG NFR BLD: 71 % (ref 40–73)
PLATELET # BLD AUTO: 325 K/UL (ref 135–420)
PMV BLD AUTO: 10.3 FL (ref 9.2–11.8)
POTASSIUM SERPL-SCNC: 3.9 MMOL/L (ref 3.5–5.5)
PROT SERPL-MCNC: 7.9 G/DL (ref 6.4–8.2)
RBC # BLD AUTO: 4.83 M/UL (ref 4.7–5.5)
SODIUM SERPL-SCNC: 142 MMOL/L (ref 136–145)
TROPONIN I SERPL-MCNC: <0.02 NG/ML (ref 0–0.06)
TROPONIN I SERPL-MCNC: <0.02 NG/ML (ref 0–0.06)
WBC # BLD AUTO: 10.5 K/UL (ref 4.6–13.2)

## 2018-10-12 PROCEDURE — 83036 HEMOGLOBIN GLYCOSYLATED A1C: CPT | Performed by: HOSPITALIST

## 2018-10-12 PROCEDURE — 74011000250 HC RX REV CODE- 250: Performed by: EMERGENCY MEDICINE

## 2018-10-12 PROCEDURE — 74011000250 HC RX REV CODE- 250: Performed by: HOSPITALIST

## 2018-10-12 PROCEDURE — 71045 X-RAY EXAM CHEST 1 VIEW: CPT

## 2018-10-12 PROCEDURE — 96374 THER/PROPH/DIAG INJ IV PUSH: CPT

## 2018-10-12 PROCEDURE — 80053 COMPREHEN METABOLIC PANEL: CPT | Performed by: EMERGENCY MEDICINE

## 2018-10-12 PROCEDURE — 85025 COMPLETE CBC W/AUTO DIFF WBC: CPT | Performed by: EMERGENCY MEDICINE

## 2018-10-12 PROCEDURE — 74011250636 HC RX REV CODE- 250/636: Performed by: EMERGENCY MEDICINE

## 2018-10-12 PROCEDURE — 77030013140 HC MSK NEB VYRM -A

## 2018-10-12 PROCEDURE — 74011636637 HC RX REV CODE- 636/637: Performed by: HOSPITALIST

## 2018-10-12 PROCEDURE — 82962 GLUCOSE BLOOD TEST: CPT

## 2018-10-12 PROCEDURE — 82550 ASSAY OF CK (CPK): CPT | Performed by: EMERGENCY MEDICINE

## 2018-10-12 PROCEDURE — 87804 INFLUENZA ASSAY W/OPTIC: CPT | Performed by: HOSPITALIST

## 2018-10-12 PROCEDURE — 94640 AIRWAY INHALATION TREATMENT: CPT

## 2018-10-12 PROCEDURE — 96375 TX/PRO/DX INJ NEW DRUG ADDON: CPT

## 2018-10-12 PROCEDURE — 74011250636 HC RX REV CODE- 250/636: Performed by: HOSPITALIST

## 2018-10-12 PROCEDURE — 36415 COLL VENOUS BLD VENIPUNCTURE: CPT | Performed by: HOSPITALIST

## 2018-10-12 PROCEDURE — 74011250637 HC RX REV CODE- 250/637: Performed by: HOSPITALIST

## 2018-10-12 PROCEDURE — 93005 ELECTROCARDIOGRAM TRACING: CPT

## 2018-10-12 PROCEDURE — 94760 N-INVAS EAR/PLS OXIMETRY 1: CPT

## 2018-10-12 PROCEDURE — 65270000029 HC RM PRIVATE

## 2018-10-12 PROCEDURE — 99285 EMERGENCY DEPT VISIT HI MDM: CPT

## 2018-10-12 PROCEDURE — C8929 TTE W OR WO FOL WCON,DOPPLER: HCPCS

## 2018-10-12 RX ORDER — BUDESONIDE 0.5 MG/2ML
500 INHALANT ORAL
Status: DISCONTINUED | OUTPATIENT
Start: 2018-10-12 | End: 2018-10-15 | Stop reason: HOSPADM

## 2018-10-12 RX ORDER — NALOXONE HYDROCHLORIDE 0.4 MG/ML
0.4 INJECTION, SOLUTION INTRAMUSCULAR; INTRAVENOUS; SUBCUTANEOUS AS NEEDED
Status: DISCONTINUED | OUTPATIENT
Start: 2018-10-12 | End: 2018-10-15 | Stop reason: HOSPADM

## 2018-10-12 RX ORDER — DOCUSATE SODIUM 100 MG/1
100 CAPSULE, LIQUID FILLED ORAL 2 TIMES DAILY
Status: DISCONTINUED | OUTPATIENT
Start: 2018-10-12 | End: 2018-10-12

## 2018-10-12 RX ORDER — KETOROLAC TROMETHAMINE 30 MG/ML
15 INJECTION, SOLUTION INTRAMUSCULAR; INTRAVENOUS
Status: DISCONTINUED | OUTPATIENT
Start: 2018-10-12 | End: 2018-10-15 | Stop reason: HOSPADM

## 2018-10-12 RX ORDER — BUPROPION HYDROCHLORIDE 150 MG/1
150 TABLET, EXTENDED RELEASE ORAL 2 TIMES DAILY
Status: DISCONTINUED | OUTPATIENT
Start: 2018-10-12 | End: 2018-10-15 | Stop reason: HOSPADM

## 2018-10-12 RX ORDER — IPRATROPIUM BROMIDE AND ALBUTEROL SULFATE 2.5; .5 MG/3ML; MG/3ML
3 SOLUTION RESPIRATORY (INHALATION)
Status: COMPLETED | OUTPATIENT
Start: 2018-10-12 | End: 2018-10-12

## 2018-10-12 RX ORDER — ONDANSETRON 2 MG/ML
4 INJECTION INTRAMUSCULAR; INTRAVENOUS
Status: DISCONTINUED | OUTPATIENT
Start: 2018-10-12 | End: 2018-10-15 | Stop reason: HOSPADM

## 2018-10-12 RX ORDER — DIPHENHYDRAMINE HYDROCHLORIDE 50 MG/ML
12.5 INJECTION, SOLUTION INTRAMUSCULAR; INTRAVENOUS
Status: DISCONTINUED | OUTPATIENT
Start: 2018-10-12 | End: 2018-10-15 | Stop reason: HOSPADM

## 2018-10-12 RX ORDER — IPRATROPIUM BROMIDE AND ALBUTEROL SULFATE 2.5; .5 MG/3ML; MG/3ML
3 SOLUTION RESPIRATORY (INHALATION)
Status: DISCONTINUED | OUTPATIENT
Start: 2018-10-12 | End: 2018-10-15 | Stop reason: HOSPADM

## 2018-10-12 RX ORDER — INSULIN LISPRO 100 [IU]/ML
INJECTION, SOLUTION INTRAVENOUS; SUBCUTANEOUS
Status: DISCONTINUED | OUTPATIENT
Start: 2018-10-12 | End: 2018-10-15 | Stop reason: HOSPADM

## 2018-10-12 RX ORDER — IPRATROPIUM BROMIDE AND ALBUTEROL SULFATE 2.5; .5 MG/3ML; MG/3ML
3 SOLUTION RESPIRATORY (INHALATION)
Status: DISCONTINUED | OUTPATIENT
Start: 2018-10-12 | End: 2018-10-12

## 2018-10-12 RX ORDER — INSULIN LISPRO 100 [IU]/ML
0.05 INJECTION, SOLUTION INTRAVENOUS; SUBCUTANEOUS
Status: DISCONTINUED | OUTPATIENT
Start: 2018-10-12 | End: 2018-10-13

## 2018-10-12 RX ORDER — TRAZODONE HYDROCHLORIDE 50 MG/1
50 TABLET ORAL
Status: DISCONTINUED | OUTPATIENT
Start: 2018-10-12 | End: 2018-10-15 | Stop reason: HOSPADM

## 2018-10-12 RX ORDER — GUAIFENESIN 600 MG/1
600 TABLET, EXTENDED RELEASE ORAL EVERY 12 HOURS
Status: DISCONTINUED | OUTPATIENT
Start: 2018-10-12 | End: 2018-10-15 | Stop reason: HOSPADM

## 2018-10-12 RX ORDER — SAME BUTANEDISULFONATE/BETAINE 400-600 MG
500 POWDER IN PACKET (EA) ORAL 2 TIMES DAILY
Status: DISCONTINUED | OUTPATIENT
Start: 2018-10-12 | End: 2018-10-15 | Stop reason: HOSPADM

## 2018-10-12 RX ORDER — DEXTROSE 50 % IN WATER (D50W) INTRAVENOUS SYRINGE
25-50 AS NEEDED
Status: DISCONTINUED | OUTPATIENT
Start: 2018-10-12 | End: 2018-10-15 | Stop reason: HOSPADM

## 2018-10-12 RX ORDER — HEPARIN SODIUM 5000 [USP'U]/ML
5000 INJECTION, SOLUTION INTRAVENOUS; SUBCUTANEOUS EVERY 8 HOURS
Status: DISCONTINUED | OUTPATIENT
Start: 2018-10-12 | End: 2018-10-15 | Stop reason: HOSPADM

## 2018-10-12 RX ORDER — ACETAMINOPHEN 325 MG/1
650 TABLET ORAL
Status: DISCONTINUED | OUTPATIENT
Start: 2018-10-12 | End: 2018-10-15 | Stop reason: HOSPADM

## 2018-10-12 RX ORDER — ARFORMOTEROL TARTRATE 15 UG/2ML
15 SOLUTION RESPIRATORY (INHALATION)
Status: DISCONTINUED | OUTPATIENT
Start: 2018-10-12 | End: 2018-10-15 | Stop reason: HOSPADM

## 2018-10-12 RX ORDER — ROSUVASTATIN CALCIUM 10 MG/1
40 TABLET, COATED ORAL
Status: DISCONTINUED | OUTPATIENT
Start: 2018-10-12 | End: 2018-10-15 | Stop reason: HOSPADM

## 2018-10-12 RX ORDER — IBUPROFEN 200 MG
1 TABLET ORAL EVERY 24 HOURS
Status: DISCONTINUED | OUTPATIENT
Start: 2018-10-12 | End: 2018-10-15 | Stop reason: HOSPADM

## 2018-10-12 RX ORDER — MAGNESIUM SULFATE 100 %
4 CRYSTALS MISCELLANEOUS AS NEEDED
Status: DISCONTINUED | OUTPATIENT
Start: 2018-10-12 | End: 2018-10-15 | Stop reason: HOSPADM

## 2018-10-12 RX ADMIN — ROSUVASTATIN CALCIUM 40 MG: 10 TABLET, FILM COATED ORAL at 21:17

## 2018-10-12 RX ADMIN — HEPARIN SODIUM 5000 UNITS: 5000 INJECTION INTRAVENOUS; SUBCUTANEOUS at 21:18

## 2018-10-12 RX ADMIN — INSULIN LISPRO 6 UNITS: 100 INJECTION, SOLUTION INTRAVENOUS; SUBCUTANEOUS at 16:57

## 2018-10-12 RX ADMIN — ARFORMOTEROL TARTRATE 15 MCG: 15 SOLUTION RESPIRATORY (INHALATION) at 19:42

## 2018-10-12 RX ADMIN — GUAIFENESIN 600 MG: 600 TABLET, EXTENDED RELEASE ORAL at 21:17

## 2018-10-12 RX ADMIN — PERFLUTREN 1 ML: 6.52 INJECTION, SUSPENSION INTRAVENOUS at 16:08

## 2018-10-12 RX ADMIN — METHYLPREDNISOLONE SODIUM SUCCINATE 60 MG: 125 INJECTION, POWDER, FOR SOLUTION INTRAMUSCULAR; INTRAVENOUS at 21:18

## 2018-10-12 RX ADMIN — IPRATROPIUM BROMIDE AND ALBUTEROL SULFATE 3 ML: .5; 3 SOLUTION RESPIRATORY (INHALATION) at 11:41

## 2018-10-12 RX ADMIN — CEFTRIAXONE 1 G: 1 INJECTION, POWDER, FOR SOLUTION INTRAMUSCULAR; INTRAVENOUS at 12:15

## 2018-10-12 RX ADMIN — Medication 500 MG: at 21:17

## 2018-10-12 RX ADMIN — BUDESONIDE 500 MCG: 0.5 INHALANT RESPIRATORY (INHALATION) at 19:42

## 2018-10-12 RX ADMIN — METHYLPREDNISOLONE SODIUM SUCCINATE 125 MG: 125 INJECTION, POWDER, FOR SOLUTION INTRAMUSCULAR; INTRAVENOUS at 11:25

## 2018-10-12 RX ADMIN — INSULIN LISPRO 4 UNITS: 100 INJECTION, SOLUTION INTRAVENOUS; SUBCUTANEOUS at 16:56

## 2018-10-12 RX ADMIN — INSULIN LISPRO 4 UNITS: 100 INJECTION, SOLUTION INTRAVENOUS; SUBCUTANEOUS at 21:18

## 2018-10-12 NOTE — IP AVS SNAPSHOT
[address] [address] 
[phone] Patient: Dangelo Manning MRN: [id number] LTK:9[birth year]8/[birth year]69 About your hospitalization You were admitted on:  October 12, 2018 You last received care in the:  [address] [place] [address] You were discharged on:  October 15, 2018 Why you were hospitalized Your primary diagnosis was:  Pneumonia Your diagnoses also included:  Dm (Diabetes Mellitus) (Hcc), Emphysema Lung (Hcc), Copd (Chronic Obstructive Pulmonary Disease) (Hcc), Chest Pain, Hard Of Hearing, Smoker, Obesity Follow-up Information Follow up With Details Comments Contact Info Conner Galicia NP On 10/22/2018 Follow up appointment scheduled for October 22, 2018 at 12:00 p.m. (Noon). Conner Galicia  [facility] [address] [phone] Phys Kezia, MD   Patient can only remember the practice name and not the physician Discharge Orders None A check elaine indicates which time of day the medication should be taken. My Medications START taking these medications Instructions Each Dose to Equal  
 Morning Noon Evening Bedtime  
 doxycycline 100 mg capsule Commonly known as:  Drinda Craze Your last dose was: Your next dose is: Take 1 Cap by mouth two (2) times a day for 7 days. 100 mg  
    
   
   
   
  
 predniSONE 5 mg dose pack Commonly known as:  STERAPRED Your last dose was: Your next dose is:    
   
   
 See administration instruction per 5mg dose pack CONTINUE taking these medications Instructions Each Dose to Equal  
 Morning Noon Evening Bedtime ADVAIR DISKUS 250-50 mcg/dose diskus inhaler Generic drug:  fluticasone-salmeterol Your last dose was: Your next dose is: Take 1 Puff by inhalation every twelve (12) hours. 1 Puff CRESTOR 40 mg tablet Generic drug:  rosuvastatin Your last dose was: Your next dose is: Take 40 mg by mouth nightly. 40 mg  
    
   
   
   
  
 glipiZIDE 10 mg tablet Commonly known as:  Krystian Dupont Your last dose was: Your next dose is: Take 10 mg by mouth daily. 10 mg PROAIR HFA 90 mcg/actuation inhaler Generic drug:  albuterol Your last dose was: Your next dose is: Take 2 Puffs by inhalation every six (6) hours as needed for Wheezing. 2 Puff SPIRIVA WITH HANDIHALER 18 mcg inhalation capsule Generic drug:  tiotropium Your last dose was: Your next dose is: Take 1 Cap by inhalation daily. 1 Cap  
    
   
   
   
  
 traZODone 50 mg tablet Commonly known as:  Theresa Ely Your last dose was: Your next dose is: Take 50 mg by mouth nightly. 50 mg WELLBUTRIN  mg SR tablet Generic drug:  buPROPion SR Your last dose was: Your next dose is: Take 150 mg by mouth two (2) times a day. 150 mg Where to Get Your Medications These medications were sent to [facility]/pharmacy #[id number]- [place], [address] [facility] [address]  [address], [address] [address] Phone:  [phone]  
  doxycycline 100 mg capsule  
 predniSONE 5 mg dose pack Discharge Instructions None [facility] Announcement We are excited to announce that we are making your provider's discharge notes available to you in [email or web address]. You will see these notes when they are completed and signed by the physician that discharged you from your recent hospital stay.   If you have any questions or concerns about any information you see in PushSpring, please call the Health Information Department where you were seen or reach out to your Primary Care Provider for more information about your plan of care. Introducing [facility] [place] [facility]! [employer] introduces "[employer]t patient portal. Now you can access parts of your medical record, email your doctor's office, and request medication refills online. 1. In your internet browser, go to [email or web address] 2. Click on the First Time User? Click Here link in the Sign In box. You will see the New Member Sign Up page. 3. Enter your PushSpring Access Code exactly as it appears below. You will not need to use this code after youve completed the sign-up process. If you do not sign up before the expiration date, you must request a new code. · PushSpring Access Code: [email or web address] Expires: 12/11/2018  5:09 PM 
 
4. Enter the last four digits of your Social Security Number (xxxx) and Date of Birth (mm/dd/yyyy) as indicated and click Submit. You will be taken to the next sign-up page. 5. Create a PushSpring ID. This will be your PushSpring login ID and cannot be changed, so think of one that is secure and easy to remember. 6. Create a PushSpring password. You can change your password at any time. 7. Enter your Password Reset Question and Answer. This can be used at a later time if you forget your password. 8. Enter your e-mail address. You will receive e-mail notification when new information is available in [address] 9. Click Sign Up. You can now view and download portions of your medical record. 10. Click the Download Summary menu link to download a portable copy of your medical information. If you have questions, please visit the Frequently Asked Questions section of the PushSpring website. Remember, PushSpring is NOT to be used for urgent needs. For medical emergencies, dial 911. Now available from your iPhone and Android! Introducing Zeus Degroot As a [facility] patient, I wanted to make you aware of our electronic visit tool called Zeus Degroot. Teez.by allows you to connect within minutes with a medical provider 24 hours a day, seven days a week via a mobile device or tablet or logging into a secure website from your computer. You can access Zeus Valentinefin from anywhere in the [place]. A virtual visit might be right for you when you have a simple condition and feel like you just dont want to get out of bed, or cant get away from work for an appointment, when your regular [facility] provider is not available (evenings, weekends or holidays), or when youre out of town and need minor care. Electronic visits cost only $49 and if the Dexmo/PLUQ provider determines a prescription is needed to treat your condition, one can be electronically transmitted to a nearby pharmacy*. Please take a moment to enroll today if you have not already done so. The enrollment process is free and takes just a few minutes. To enroll, please download the Teez.by ruben to your tablet or phone, or visit [email or web address] to enroll on your computer. And, as an [address] [place] [address] patient with a [employer] account, the results of your visits will be scanned into your electronic medical record and your primary care provider will be able to view the scanned results. We urge you to continue to see your [facility] for your ongoing medical care. And while your primary care provider may not be the one available when you seek a Zeus Hernandezangelitofin virtual visit, the peace of mind you get from getting a real diagnosis real time can be priceless. For more information on Zeus Davidangelitofin, view our Frequently Asked Questions (FAQs) at [email or web address]. Sincerely, 
 
Nakul Porter MD 
Chief Medical Officer Shirley8 Cira Salazar *:  certain medications cannot be prescribed via [facility] Unresulted Labs-Please follow up with your PCP about these lab tests Order Current Status EKG, 12 LEAD, INITIAL Preliminary result Providers Seen During Your Hospitalization Provider Specialty Primary office phone Umang Zheng MD Emergency Medicine [phone] Duke Mayes MD Internal Medicine [phone] Your Primary Care Physician (PCP) Primary Care Physician Office Phone Office Fax OTHER, PHYS ** None ** ** None ** You are allergic to the following Allergen Reactions Oxycodone Anaphylaxis Recent Documentation Height Weight BMI Smoking Status 1.803 m 111.6 kg 34.31 kg/m2 Current Every Day Smoker Emergency Contacts Name Discharge Info Relation Home Work Mobile None,None N/A  AT THIS TIME [6] Patient Belongings The following personal items are in your possession at time of discharge: 
  Dental Appliances: None  Visual Aid: None   Hearing Aids/Status: At bedside  Home Medications: None   Jewelry: None  Clothing: Hat, Pants, Undergarments, Shirt    Other Valuables: None Discharge Instructions Attachments/References PNEUMONIA (ENGLISH) Patient Handouts Pneumonia: Care Instructions Your Care Instructions Pneumonia is an infection of the lungs. Most cases are caused by infections from bacteria or viruses. Pneumonia may be mild or very severe. If it is caused by bacteria, you will be treated with antibiotics. It may take a few weeks to a few months to recover fully from pneumonia, depending on how sick you were and whether your overall health is good. Follow-up care is a key part of your treatment and safety. Be sure to make and go to all appointments, and call your doctor if you are having problems. It's also a good idea to know your test results and keep a list of the medicines you take. How can you care for yourself at home? · Take your antibiotics exactly as directed. Do not stop taking the medicine just because you are feeling better. You need to take the full course of antibiotics. · Take your medicines exactly as prescribed. Call your doctor if you think you are having a problem with your medicine. · Get plenty of rest and sleep. You may feel weak and tired for a while, but your energy level will improve with time. · To prevent dehydration, drink plenty of fluids, enough so that your urine is light yellow or clear like water. Choose water and other caffeine-free clear liquids until you feel better. If you have kidney, heart, or liver disease and have to limit fluids, talk with your doctor before you increase the amount of fluids you drink. · Take care of your cough so you can rest. A cough that brings up mucus from your lungs is common with pneumonia. It is one way your body gets rid of the infection. But if coughing keeps you from resting or causes severe fatigue and chest-wall pain, talk to your doctor. He or she may suggest that you take a medicine to reduce the cough. · Use a vaporizer or humidifier to add moisture to your bedroom. Follow the directions for cleaning the machine. · Do not smoke or allow others to smoke around you. Smoke will make your cough last longer. If you need help quitting, talk to your doctor about stop-smoking programs and medicines. These can increase your chances of quitting for good. · Take an over-the-counter pain medicine, such as acetaminophen (Tylenol), ibuprofen (Advil, Motrin), or naproxen (Aleve). Read and follow all instructions on the label. · Do not take two or more pain medicines at the same time unless the doctor told you to. Many pain medicines have acetaminophen, which is Tylenol. Too much acetaminophen (Tylenol) can be harmful.  
· If you were given a spirometer to measure how well your lungs are working, use it as instructed. This can help your doctor tell how your recovery is going. · To prevent pneumonia in the future, talk to your doctor about getting a flu vaccine (once a year) and a pneumococcal vaccine (one time only for most people). When should you call for help? Call 911 anytime you think you may need emergency care. For example, call if: 
  · You have severe trouble breathing.  
 Call your doctor now or seek immediate medical care if: 
  · You cough up dark brown or bloody mucus (sputum).  
  · You have new or worse trouble breathing.  
  · You are dizzy or lightheaded, or you feel like you may faint.  
 Watch closely for changes in your health, and be sure to contact your doctor if: 
  · You have a new or higher fever.  
  · You are coughing more deeply or more often.  
  · You are not getting better after 2 days (48 hours).  
  · You do not get better as expected. Where can you learn more? Go to http://huber-richelle.info/. Enter [id number] in the search box to learn more about \"Pneumonia: Care Instructions. \" Current as of: December 6, 2017 Content Version: 11.8 © 0330-7061 [employer]. Care instructions adapted under license by [employer] (which disclaims liability or warranty for this information). If you have questions about a medical condition or this instruction, always ask your healthcare professional. [address] any warranty or liability for your use of this information. Please provide this summary of care documentation to your next provider. Signatures-by signing, you are acknowledging that this After Visit Summary has been reviewed with you and you have received a copy. Patient Signature:  ____________________________________________________________ Date:  ____________________________________________________________  
  
Francine Rossi  Provider Signature: ____________________________________________________________ Date:  ____________________________________________________________

## 2018-10-12 NOTE — H&P
History & Physical 
Patient: Arelis Luis MRN: [id number]  CSN: [id number] YOB: 1960  Age: 60 y.o. Sex: male DOA: 10/12/2018 Primary Care Provider:  Maria Alexis MD 
 
 
Assessment/Plan Hospital Problems  Date Reviewed: 9/12/2018 Codes Class Noted POA * (Principal)Pneumonia ICD-10-CM: J18.9 ICD-9-CM: 301  10/12/2018 Unknown Emphysema lung ([facility] ICD-10-CM: J43.9 ICD-9-CM: 492.8  10/12/2018 Unknown Chest pain ICD-10-CM: R07.9 ICD-9-CM: 786.50  10/12/2018 Unknown Hard of hearing ICD-10-CM: H91.90 ICD-9-CM: 389.9  10/12/2018 Unknown Smoker ICD-10-CM: R15.363 ICD-9-CM: 305.1  10/12/2018 Unknown Obesity ICD-10-CM: E66.9 ICD-9-CM: 278.00  10/12/2018 Unknown COPD (chronic obstructive pulmonary disease) (HCC) ICD-10-CM: J44.9 ICD-9-CM: 172  9/12/2018 Yes DM (diabetes mellitus) ([facility] ICD-10-CM: E11.9 ICD-9-CM: 250.00  9/12/2018 Yes Admit to remote tele PNA Breathing tx, iv abx Symptoms treatment Will have flu screening Copd-like exacerbation Jasvir Nuñez Iv steroid, breathing Need smoking cessation Chest pain Like due to cough Will have ce trend , echo Due to risk factor DM type II , with complication, 
-on lantus, , ssi, diabetic diet , hypoglycemia protocol   
  
Obesity Smoker Nicotine patch DVT : heparin,  ppi proph CC: cough and chest pain HPI:  
 
Arelis Luis is a 60 y.o. male who hx of copd, dm, emphysema came to er due chest pain and cough for 2 days. The pain located in epigastric area, after cough, reported sweats a lot, not check T. Associated with  Productive cough and wheezing, mild sob . He is current a smoker. cxr indicated :Pneumonic infiltrate involving the right lower lung. Denies any slurred speech/headache/n/v/blurred vission/d/c/palpitation/gait change/bleeding. Denies  any alcohol or drug use. Visit Vitals  /75  Pulse 67  Temp 98 °F (36.7 °C)  Resp 30  
 Ht 5' 11\" (1.803 m)  Wt 111.6 kg (246 lb)  SpO2 91%  BMI 34.31 kg/m2 O2 Device: Room air Past Medical History:  
Diagnosis Date  COPD (chronic obstructive pulmonary disease) (HCC)  Diabetes ([facility]  Emphysema lung ([facility]  Hernia, abdominal   
 Hypercholesteremia Past Surgical History:  
Procedure Laterality Date  HX BACK SURGERY History reviewed. No pertinent family history. Social History Social History  Marital status: SINGLE Spouse name: N/A  
 Number of children: N/A  
 Years of education: N/A Social History Main Topics  Smoking status: Current Every Day Smoker Packs/day: 0.50  Smokeless tobacco: Never Used  Alcohol use No  
 Drug use: None  Sexual activity: Not Asked Other Topics Concern  None Social History Narrative Prior to Admission medications Medication Sig Start Date End Date Taking? Authorizing Provider  
fluticasone-salmeterol (ADVAIR DISKUS) 250-50 mcg/dose diskus inhaler Take 1 Puff by inhalation every twelve (12) hours. Historical Provider  
rosuvastatin (CRESTOR) 40 mg tablet Take 40 mg by mouth nightly. Historical Provider  
glipiZIDE (GLUCOTROL) 10 mg tablet Take 10 mg by mouth daily. Historical Provider buPROPion SR (WELLBUTRIN SR) 150 mg SR tablet Take 150 mg by mouth two (2) times a day. Historical Provider  
traZODone (DESYREL) 50 mg tablet Take 50 mg by mouth nightly. Historical Provider  
albuterol (PROAIR HFA) 90 mcg/actuation inhaler Take 2 Puffs by inhalation every six (6) hours as needed for Wheezing. Historical Provider  
tiotropium (SPIRIVA WITH HANDIHALER) 18 mcg inhalation capsule Take 1 Cap by inhalation daily. Historical Provider Allergies Allergen Reactions  Oxycodone Anaphylaxis Review of Systems Gen: No fever, +chills, + malaise, weight loss/gain. Heent: No headache, rhinorrhea, epistaxis, ear pain, hearing loss, sinus pain, neck pain/stiffness, sore throat. Heart: + chest pain, no palpitations, BRICEÑO, pnd, or orthopnea. Resp:+ cough, no hemoptysis, +wheezing and shortness of breath. GI: No nausea, vomiting, diarrhea, constipation, melena or hematochezia. : No urinary obstruction, dysuria or hematuria. Derm: No rash, new skin lesion or pruritis. Musc/skeletal: no bone or joint complains. Vasc: No edema, cyanosis or claudication. Endo: No heat/cold intolerance, no polyuria,polydipsia or polyphagia. Neuro: No unilateral weakness, numbness, tingling. No seizures. Heme: No easy bruising or bleeding. Physical Exam:  
 
Physical Exam: 
Visit Vitals  /75  Pulse 67  Temp 98 °F (36.7 °C)  Resp 30  
 Ht 5' 11\" (1.803 m)  Wt 111.6 kg (246 lb)  SpO2 91%  BMI 34.31 kg/m2 O2 Device: Room air Temp (24hrs), Av °F (36.7 °C), Min:98 °F (36.7 °C), Max:98 °F (36.7 °C) General:  Awake, cooperative, no distress. Head:  Normocephalic, without obvious abnormality, atraumatic. Eyes:  Conjunctivae/corneas clear, sclera anicteric, PERRL, EOMs intact. Nose: Nares normal. No drainage or sinus tenderness. Throat: Lips, mucosa, and tongue normal. .  
Neck: Supple, symmetrical, trachea midline, no adenopathy. Lungs:   Wheezing and some rales of rt Lobe Heart:  Regular rate and rhythm, S1, S2 normal, no murmur, click, rub or gallop. Abdomen: Soft, non-tender. Bowel sounds normal. No masses,  No organomegaly. Extremities: Extremities normal, atraumatic, no cyanosis or edema. Pulses: 2+ and symmetric all extremities. Skin: Skin color-pink, texture, turgor normal. No rashes or lesions. Capillary refill normal   
Neurologic: CNII-XII intact. No focal motor or sensory deficit. Labs Reviewed: 
 
BMP:  
Lab Results Component Value Date/Time   10/12/2018 11:30 AM  
 K 3.9 10/12/2018 11:30 AM  
  10/12/2018 11:30 AM  
 CO2 30 10/12/2018 11:30 AM  
 AGAP 9 10/12/2018 11:30 AM  
 GLU 89 10/12/2018 11:30 AM  
 BUN 20 (H) 10/12/2018 11:30 AM  
 CREA 1.02 10/12/2018 11:30 AM  
 GFRAA >60 10/12/2018 11:30 AM  
 GFRNA >60 10/12/2018 11:30 AM  
 
CMP:  
Lab Results Component Value Date/Time  10/12/2018 11:30 AM  
 K 3.9 10/12/2018 11:30 AM  
  10/12/2018 11:30 AM  
 CO2 30 10/12/2018 11:30 AM  
 AGAP 9 10/12/2018 11:30 AM  
 GLU 89 10/12/2018 11:30 AM  
 BUN 20 (H) 10/12/2018 11:30 AM  
 CREA 1.02 10/12/2018 11:30 AM  
 GFRAA >60 10/12/2018 11:30 AM  
 GFRNA >60 10/12/2018 11:30 AM  
 CA 9.2 10/12/2018 11:30 AM  
 ALB 3.3 (L) 10/12/2018 11:30 AM  
 TP 7.9 10/12/2018 11:30 AM  
 GLOB 4.6 (H) 10/12/2018 11:30 AM  
 AGRAT 0.7 (L) 10/12/2018 11:30 AM  
 SGOT 19 10/12/2018 11:30 AM  
 ALT 39 10/12/2018 11:30 AM  
 
CBC:  
Lab Results Component Value Date/Time WBC 10.5 10/12/2018 11:30 AM  
 HGB 15.9 10/12/2018 11:30 AM  
 HCT 46.9 10/12/2018 11:30 AM  
  10/12/2018 11:30 AM  
 
All Cardiac Markers in the last 24 hours:  
Lab Results Component Value Date/Time  10/12/2018 11:30 AM  
 CKMB <1.0 10/12/2018 11:30 AM  
 CKND1  10/12/2018 11:30 AM  
  CALCULATION NOT PERFORMED WHEN RESULT IS BELOW LINEAR LIMIT  
 TROIQ <0.02 10/12/2018 11:30 AM  
 
Recent Glucose Results:  
Lab Results Component Value Date/Time GLU 89 10/12/2018 11:30 AM  
 
ABG: No results found for: PH, PHI, PCO2, PCO2I, PO2, PO2I, HCO3, HCO3I, FIO2, FIO2I 
COAGS: No results found for: APTT, PTP, INR Liver Panel:  
Lab Results Component Value Date/Time ALB 3.3 (L) 10/12/2018 11:30 AM  
 TP 7.9 10/12/2018 11:30 AM  
 GLOB 4.6 (H) 10/12/2018 11:30 AM  
 AGRAT 0.7 (L) 10/12/2018 11:30 AM  
 SGOT 19 10/12/2018 11:30 AM  
 ALT 39 10/12/2018 11:30 AM  
 AP 76 10/12/2018 11:30 AM  
 
Pancreatic Markers: No results found for: AMYLPOCT, AML, LIPPOCT, LPSE Procedures/imaging: see electronic medical records for all procedures/Xrays and details which were not copied into this note but were reviewed prior to creation of Plan Yang Godfrey MD, Internal Medicine CC: Judy Mast MD

## 2018-10-12 NOTE — ED PROVIDER NOTES
EMERGENCY DEPARTMENT HISTORY AND PHYSICAL EXAM 
 
Date: 10/12/2018 Patient Name: Ham Browne History of Presenting Illness Chief Complaint Patient presents with  Shortness of Breath History Provided By: Patient Chief Complaint: chest pain Duration: 2 Days Timing:  Constant Location: lower bilateral 
Associated Symptoms: cough productive of green sputum, post tussive vomiting, SOB, and hand cramping Additional History (Context):  
11:14 AM 
Ham Browne is a 60 y.o. male with PMHX of COPD, DM, HLD, Emphysema, and abd hernia who presents to the emergency department C/O chest pain (lower bilateral) onset 2 days ago. Associated sxs include cough productive of green sputum, post tussive vomiting, SOB, and hand cramping. Allergy reported to Oxycodone. PSHx of back surgery. Pt endorses being a cigarette smoker at 0.5 ppd and a non EtOH user. Pt denies fever, leg swelling, and any other sxs or complaints. PCP: Judy Mast MD 
 
Current Facility-Administered Medications Medication Dose Route Frequency Provider Last Rate Last Dose  azithromycin (ZITHROMAX) 500 mg in 0.9% sodium chloride 250 mL IVPB  500 mg IntraVENous NOW Dillan Echeverria MD      
 
Current Outpatient Prescriptions Medication Sig Dispense Refill  fluticasone-salmeterol (ADVAIR DISKUS) 250-50 mcg/dose diskus inhaler Take 1 Puff by inhalation every twelve (12) hours.  rosuvastatin (CRESTOR) 40 mg tablet Take 40 mg by mouth nightly.  glipiZIDE (GLUCOTROL) 10 mg tablet Take 10 mg by mouth daily.  buPROPion SR (WELLBUTRIN SR) 150 mg SR tablet Take 150 mg by mouth two (2) times a day.  traZODone (DESYREL) 50 mg tablet Take 50 mg by mouth nightly.  albuterol (PROAIR HFA) 90 mcg/actuation inhaler Take 2 Puffs by inhalation every six (6) hours as needed for Wheezing.  tiotropium (SPIRIVA WITH HANDIHALER) 18 mcg inhalation capsule Take 1 Cap by inhalation daily. Past History Past Medical History: 
Past Medical History:  
Diagnosis Date  COPD (chronic obstructive pulmonary disease) (HCC)  Diabetes ([facility] [address]  Emphysema lung ([facility] [address]  Hernia, abdominal   
 Hypercholesteremia Past Surgical History: 
Past Surgical History:  
Procedure Laterality Date  HX BACK SURGERY Family History: 
History reviewed. No pertinent family history. Social History: 
Social History Substance Use Topics  Smoking status: Current Every Day Smoker Packs/day: 0.50  Smokeless tobacco: Never Used  Alcohol use No  
 
 
Allergies: Allergies Allergen Reactions  Oxycodone Anaphylaxis Review of Systems Review of Systems Constitutional: Negative for fever. Respiratory: Positive for cough and shortness of breath. Cardiovascular: Positive for chest pain. Negative for leg swelling. Gastrointestinal: Positive for vomiting (post tussive). Musculoskeletal:  
     (+) Hand cramping All other systems reviewed and are negative. Physical Exam  
 
Vitals:  
 10/12/18 1117 10/12/18 1141 Pulse: 77 Resp: 20 Temp: 98 °F (36.7 °C) SpO2: 93% 92% Weight: 111.6 kg (246 lb) Height: 5' 11\" (1.803 m) Physical Exam  
Nursing note and vitals reviewed. Constitutional: Appearing older than stated age, moderate acute distress with persistent dry cough. Disheveled, poor hygiene Head: Normocephalic, Atraumatic Eyes: EOMI Neck: Supple Cardiovascular: Regular rate and rhythm, no murmurs, rubs, or gallops Chest: Normal work of breathing and chest excursion bilaterally Lungs: Diminished in all lung fields with scattered expiratory wheezes. Abdomen: Soft, non tender, non distended, normoactive bowel sounds Back: No evidence of trauma or deformity Extremities: No evidence of trauma or deformity, no LE edema Skin: Warm and dry, normal cap refill Neuro: Alert and appropriate Psychiatric: Normal mood and affect Diagnostic Study Results Labs - Recent Results (from the past 12 hour(s)) CBC WITH AUTOMATED DIFF Collection Time: 10/12/18 11:30 AM  
Result Value Ref Range WBC 10.5 4.6 - 13.2 K/uL  
 RBC 4.83 4.70 - 5.50 M/uL  
 HGB 15.9 13.0 - 16.0 g/dL HCT 46.9 36.0 - 48.0 % MCV 97.1 (H) 74.0 - 97.0 FL  
 MCH 32.9 24.0 - 34.0 PG  
 MCHC 33.9 31.0 - 37.0 g/dL  
 RDW 13.3 11.6 - 14.5 % PLATELET 999 649 - 131 K/uL MPV 10.3 9.2 - 11.8 FL  
 NEUTROPHILS 71 40 - 73 % LYMPHOCYTES 20 (L) 21 - 52 % MONOCYTES 8 3 - 10 % EOSINOPHILS 1 0 - 5 % BASOPHILS 0 0 - 2 %  
 ABS. NEUTROPHILS 7.4 1.8 - 8.0 K/UL  
 ABS. LYMPHOCYTES 2.1 0.9 - 3.6 K/UL  
 ABS. MONOCYTES 0.9 0.05 - 1.2 K/UL  
 ABS. EOSINOPHILS 0.1 0.0 - 0.4 K/UL  
 ABS. BASOPHILS 0.0 0.0 - 0.1 K/UL  
 DF AUTOMATED METABOLIC PANEL, COMPREHENSIVE Collection Time: 10/12/18 11:30 AM  
Result Value Ref Range Sodium 142 136 - 145 mmol/L Potassium 3.9 3.5 - 5.5 mmol/L Chloride 103 100 - 108 mmol/L  
 CO2 30 21 - 32 mmol/L Anion gap 9 3.0 - 18 mmol/L Glucose 89 74 - 99 mg/dL BUN 20 (H) 7.0 - 18 MG/DL Creatinine 1.02 0.6 - 1.3 MG/DL  
 BUN/Creatinine ratio 20 12 - 20 GFR est AA >60 >60 ml/min/1.73m2 GFR est non-AA >60 >60 ml/min/1.73m2 Calcium 9.2 8.5 - 10.1 MG/DL Bilirubin, total 0.4 0.2 - 1.0 MG/DL  
 ALT (SGPT) 39 16 - 61 U/L  
 AST (SGOT) 19 15 - 37 U/L Alk. phosphatase 76 45 - 117 U/L Protein, total 7.9 6.4 - 8.2 g/dL Albumin 3.3 (L) 3.4 - 5.0 g/dL Globulin 4.6 (H) 2.0 - 4.0 g/dL A-G Ratio 0.7 (L) 0.8 - 1.7 CARDIAC PANEL,(CK, CKMB & TROPONIN) Collection Time: 10/12/18 11:30 AM  
Result Value Ref Range  39 - 308 U/L  
 CK - MB <1.0 <3.6 ng/ml CK-MB Index  0.0 - 4.0 % CALCULATION NOT PERFORMED WHEN RESULT IS BELOW LINEAR LIMIT Troponin-I, Qt. <0.02 0.00 - 0.06 NG/ML Radiologic Studies -  
XR CHEST PORT Final Result CT Results  (Last 48 hours) None CXR Results  (Last 48 hours) 10/12/18 1131  XR CHEST PORT Final result Impression:  IMPRESSION:  
   
Pneumonic infiltrate involving the right lower lung. Please consider repeat imaging following a course of treatment to ensure  
complete resolution. Narrative:  EXAM: One view chest x-ray CLINICAL INDICATION/HISTORY: Chest pain and dyspnea. COMPARISON: 09/12/2018. TECHNIQUE: Single AP view of the chest was obtained.  
   
_______________ FINDINGS:  
   
HEART, VESSELS, MEDIASTINUM: Heart size is normal. No vascular congestion. LUNGS, PLEURAL SPACES: Small, new infiltrate involving the right lower lung. Left lung is clear. No effusion or pneumothorax. BONY THORAX, SOFT TISSUES: Unremarkable.  
   
_______________ Medications given in the ED- Medications  
azithromycin (ZITHROMAX) 500 mg in 0.9% sodium chloride 250 mL IVPB (not administered)  
albuterol-ipratropium (DUO-NEB) 2.5 MG-0.5 MG/3 ML (3 mL Nebulization Given 10/12/18 1141) methylPREDNISolone (PF) (SOLU-MEDROL) injection 125 mg (125 mg IntraVENous Given 10/12/18 1125) cefTRIAXone (ROCEPHIN) 1 g in sterile water (preservative free) 10 mL IV syringe (1 g IntraVENous Given 10/12/18 1215) Medical Decision Making I am the first provider for this patient. I reviewed the vital signs, available nursing notes, past medical history, past surgical history, family history and social history. Vital Signs-Reviewed the patient's vital signs. Pulse Oximetry Analysis - 93% on room air Cardiac Monitor: 
Rate: 65 bpm 
Rhythm: sinus rhythm EKG interpretation: (Preliminary) 11:14 AM  
NSR at 74 bpm. QRS duration at 74 ms. EKG read by Carmen Feng MD at 11:16 AM  
 
Records Reviewed: Nursing Notes and Old Medical Records Provider Notes (Medical Decision Making): 60year old male presents with hx and exam and XR consistent with PNA and COPD exacerbation.  Becomes hypoxic here with minimal exertion. Abx initiated and discussed with hospitalist for further inpatient management. Procedures: 
Procedures ED Course:  
11:14 AM Initial assessment performed. The patients presenting problems have been discussed, and they are in agreement with the care plan formulated and outlined with them. I have encouraged them to ask questions as they arise throughout their visit. 12:03 PM Wheezing is improved, but oxygen drops to 88% with just conversing with me. 12:19 PM Discussed patient's history, exam, and available diagnostics results with Duke Mayes MD, internal medicine, who agree with accepting pt to the Medical Floor. Diagnosis and Disposition Critical Care Time:  
 
Core Measures: 
For Hospitalized Patients: 
 
1. Hospitalization Decision Time: The decision to hospitalize the patient was made by Blank Villa MD at 12:03 PM on 10/12/2018 2. Aspirin: Aspirin was not given because the patient did not present with a stroke at the time of their Emergency Department evaluation 12:19 PM Patient is being admitted to the hospital by Duke Mayes MD. The results of their tests and reasons for their admission have been discussed with them and/or available family. They convey agreement and understanding for the need to be admitted and for their admission diagnosis. CONDITIONS ON ADMISSION: 
Sepsis is not present at the time of admission. Deep Vein Thrombosis is not present at the time of admission. Thrombosis is not present at the time of admission. Urinary Tract Infection is not present at the time of admission. Pneumonia is present at the time of admission. MRSA is not present at the time of admission. Wound infection is not present at the time of admission. Pressure Ulcer is not present at the time of admission. CLINICAL IMPRESSION: 
 
1. Community acquired pneumonia of right lower lobe of lung ([address] 2. Acute exacerbation of chronic obstructive pulmonary disease (COPD) ([facility] [address] 3. Hypoxia PLAN: 
1. Admit to Medical Floor 
_______________________________ Attestations: This note is prepared by Augustus Chen, acting as Scribe for Viraj Dawn MD. Viraj Dawn MD:  The scribe's documentation has been prepared under my direction and personally reviewed by me in its entirety. I confirm that the note above accurately reflects all work, treatment, procedures, and medical decision making performed by me. 
_______________________________

## 2018-10-12 NOTE — ED NOTES
Diet tray order for patient per his request. Patient awake a/o x4. Moist productive cough noted.
Echocardiogram complete.  Patient transferring to 3S with tech via wheelchair on O2 2L via NC. 

O2 sats between 88% to 90%; O2 via nc at 2L applied per Dr Montenegro Board order. Patient in and out of sleep. Sleep apnea present.
Patient presents to the er with complaint of difficulty breathing and productive cough. Patient a/o x4, pleasant and talkative.
RN in room for purpose of hourly rounding. Room checked for trash and safety hazards.  
  
Patient is. .. 
  
[  ] seated at bedside. [ x ] lying in bed with side rails up and call bell in reach. [  ] lying in with call bell in reach and continuous monitoring in place. 
  
  
  
Pain reduction interventions. .. 
  
[ x ] not indicated at this time 
    include the following 
                      [ ] administration of analgesic medications [ ] lights turned down [ ] patient offered a cool washcloth 
                      [ ] heat applied [ ] ice applied [ ] patient repositioned 
  
Restroom needs addressed. Patient is. .. [facility]   
[  ] Not in need restroom assistance at this time [  ] Ambulatory as needed to the restroom [  ] Assisted to bedside commode [  ] Assisted with use of bed pan [  ] Provided with a urinal 
  
Position. .. 
  
[ x ] Patient does not require assistance with repositioning [  ] Patient repositions with assistance of RN 
[  ] Patient repositioned with assistance of RN and other ED staff. Patient eating lunch; no distress noted.
TRANSFER - OUT REPORT: 
 
Verbal report given to [address] on [facility]  being transferred to (954) for routine progression of care Report consisted of patients Situation, Background, Assessment and  
Recommendations(SBAR). Information from the following report(s) SBAR, ED Summary and MAR was reviewed with the receiving nurse. Lines:  
Peripheral IV 10/12/18 Right Antecubital (Active) Opportunity for questions and clarification was provided. Patient transported with: 
 O2 @ 2 liters Tech
Not applicable

## 2018-10-12 NOTE — PROGRESS NOTES
Reason for Readmission:    cough and chest pain RRAT Score and Risk Level:    Low; 8 Level of Readmission:   1 Care Conference scheduled:   TBD Resources/supports as identified by patient/family:    
    
Top Challenges facing patient (as identified by patient/family and CM):  Lives in his [facility] Finances/Medication cost?      
Transportation Support system or lack thereof? Living arrangements? Self-care/ADLs/Cognition? Current Advanced Directive/Advance Care Plan:  Not on File Plan for utilizing home health:   TBD Likelihood of additional readmission:    
          
Transition of Care Plan:    Based on readmission, the patient's previous Plan of Care 
 has been evaluated and/or modified. The current Transition of Care Plan is:    TBD Chart reviewed. Pt is [facility] and has a history of copd, dm, and emphysema. Pt came to er due chest pain and cough for 2 days. The pain located in epigastric area, after cough, reported sweats a lot. Pt also with a productive cough and wheezing, mild sob . He is a current smoker. Pt's chest x-ray indicated pneumonic infiltrate involving the right lower lung. During previous admission pt was living in his [facility] on his friend's property. Home O2 was recommended during previous admission and pt elected to follow up with his PCP to be managed regarding home O2. Pt currently with a PT/OT consult pending. CM to await outcome of evaluations to assist with identifying an appropriate transition of care plan. Continuing go follow. Care Management Interventions Mode of Transport at Discharge: Self Transition of Care Consult (CM Consult): Discharge Planning Health Maintenance Reviewed: Yes Physical Therapy Consult: Yes Occupational Therapy Consult: Yes Current Support Network: Other (lives in his [facility] on friend's property) Confirm Follow Up Transport: Self

## 2018-10-12 NOTE — IP AVS SNAPSHOT
[address] [place] [id number] 
[phone] Patient: Kindra Salter MRN: [id number] LEO:[birth year] A check elaine indicates which time of day the medication should be taken. My Medications START taking these medications Instructions Each Dose to Equal  
 Morning Noon Evening Bedtime  
 doxycycline 100 mg capsule Commonly known as:  Linh  Your last dose was: Your next dose is: Take 1 Cap by mouth two (2) times a day for 7 days. 100 mg  
    
   
   
   
  
 predniSONE 5 mg dose pack Commonly known as:  STERAPRED Your last dose was: Your next dose is:    
   
   
 See administration instruction per 5mg dose pack CONTINUE taking these medications Instructions Each Dose to Equal  
 Morning Noon Evening Bedtime ADVAIR DISKUS 250-50 mcg/dose diskus inhaler Generic drug:  fluticasone-salmeterol Your last dose was: Your next dose is: Take 1 Puff by inhalation every twelve (12) hours. 1 Puff CRESTOR 40 mg tablet Generic drug:  rosuvastatin Your last dose was: Your next dose is: Take 40 mg by mouth nightly. 40 mg  
    
   
   
   
  
 glipiZIDE 10 mg tablet Commonly known as:  Paul Manger Your last dose was: Your next dose is: Take 10 mg by mouth daily. 10 mg PROAIR HFA 90 mcg/actuation inhaler Generic drug:  albuterol Your last dose was: Your next dose is: Take 2 Puffs by inhalation every six (6) hours as needed for Wheezing. 2 Puff SPIRIVA WITH HANDIHALER 18 mcg inhalation capsule Generic drug:  tiotropium Your last dose was: Your next dose is: Take 1 Cap by inhalation daily. 1 Cap  
    
   
   
   
  
 traZODone 50 mg tablet Commonly known as:  Sayra Luna Your last dose was: Your next dose is: Take 50 mg by mouth nightly. 50 mg WELLBUTRIN  mg SR tablet Generic drug:  buPROPion SR Your last dose was: Your next dose is: Take 150 mg by mouth two (2) times a day. 150 mg Where to Get Your Medications These medications were sent to [facility]/pharmacy #[id number]- [facility], [address]  [address], [address] [place] [address] Phone:  [phone]  
  doxycycline 100 mg capsule  
 predniSONE 5 mg dose pack

## 2018-10-12 NOTE — PROGRESS NOTES
1945 - Attempted to complete dual skin assessment with off going nurse. Pt refused stating \"You don't have that right. \" Reassured pt that the skin assessment is standard protocol but continued to refuse. Shift summary: Pt SPO2 decreased during sleep to 70's. 2L of O2 via n.c applied by Sita Graham, RT. Attempted to put pt on CPAP but refused stating \"I don't want to get used to using it when I'm not going to use it when I leave. \" Scattered wheezing noted on assessment but clear on reassessment following neb tx. Tele box #39; NSR with some sinus frank. Patient Vitals for the past 12 hrs: 
 Temp Pulse Resp BP SpO2  
10/13/18 0408 97.4 °F (36.3 °C) 71 18 111/54 98 % 10/13/18 0231 - (!) 55 - - -  
10/12/18 2254 - 65 - - -  
10/12/18 2245 97.7 °F (36.5 °C) 62 18 104/55 94 % 10/12/18 1908 98.1 °F (36.7 °C) 66 19 115/60 96 %

## 2018-10-12 NOTE — IP AVS SNAPSHOT
Summary of Care Report The Summary of Care report has been created to help improve care coordination. Users with access to [employer] or [address] (Web-based application) may access additional patient information including the Discharge Summary. If you are not currently a [address] user and need more information, please call the number listed below in the [address] section and ask to be connected with Medical Records. Facility Information Name Address Phone [address] [address] [phone] Patient Information Patient Name Sex  Mehdi García ([id number]) Male 1960 Discharge Information Admitting Provider Service Area Unit Sophie Maravilla MD / [address] Surg/Onco / [phone] Discharge Provider Discharge Date/Time Discharge Disposition Destination (none) 10/15/2018 (Pending) AHR (none) Patient Language Language ENGLISH [13] Hospital Problems as of 10/15/2018  Reviewed: 2018  7:47 PM by Whitley Gifford MD  
  
  
  
 Class Noted - Resolved Last Modified POA Active Problems COPD (chronic obstructive pulmonary disease) ([facility] [address]  2018 - Present 10/12/2018 by Sophie Maravilla MD Yes Entered by Whitley Gifford MD  
  DM (diabetes mellitus) ([facility] [address]  2018 - Present 10/12/2018 by Sophie Maravilla MD Yes Entered by Whitley Gifford MD  
  * (Principal)Pneumonia  10/12/2018 - Present 10/12/2018 by Sophie Maravilla MD Unknown Entered by Dionisio Pena MD  
  Emphysema lung ([facility] [address]  10/12/2018 - Present 10/12/2018 by Sophie Maravilla MD Unknown Entered by Sophie Maravilla MD  
  Chest pain  10/12/2018 - Present 10/12/2018 by Sophie Maravilla MD Unknown Entered by Sophie Maravilla MD  
  Hard of hearing  10/12/2018 - Present 10/12/2018 by Sophie Maravilla MD Unknown   Entered by Sophie Maravilla MD  
 Smoker  10/12/2018 - Present 10/12/2018 by Nj Gomez MD Unknown Entered by Nj Gomez MD  
  Obesity  10/12/2018 - Present 10/12/2018 by Nj Gomez MD Unknown Entered by Nj Gomez MD  
  
Non-Hospital Problems as of 10/15/2018  Reviewed: 9/12/2018  7:47 PM by Ronak Rousseau MD  
  
  
  
 Class Noted - Resolved Last Modified Active Problems Hypoxia  9/12/2018 - Present 9/12/2018 by Ronak Rousseau MD  
  Entered by OMARI Ardon Hypoxemia  9/12/2018 - Present 9/12/2018 by Ronak Rousseau MD  
  Entered by Ronak Rousseau MD  
  Hypernatremia  9/12/2018 - Present 9/12/2018 by Ronak Rousseau MD  
  Entered by Ronak Rousseau MD  
  COPD (chronic obstructive pulmonary disease) with acute bronchitis ([facility] [address]  9/12/2018 - Present 9/12/2018 by Ronak Rousseau MD  
  Entered by Ronak Rousseau MD  
  
You are allergic to the following Allergen Reactions Oxycodone Anaphylaxis Current Discharge Medication List  
  
START taking these medications Dose & Instructions Dispensing Information Comments  
 doxycycline 100 mg capsule Commonly known as:  Sirisha Ache Dose:  100 mg Take 1 Cap by mouth two (2) times a day for 7 days. Quantity:  14 Cap Refills:  0  
   
 predniSONE 5 mg dose pack Commonly known as:  STERAPRED See administration instruction per 5mg dose pack Quantity:  21 Tab Refills:  0 CONTINUE these medications which have NOT CHANGED Dose & Instructions Dispensing Information Comments ADVAIR DISKUS 250-50 mcg/dose diskus inhaler Generic drug:  fluticasone-salmeterol Dose:  1 Puff Take 1 Puff by inhalation every twelve (12) hours. Refills:  0  
   
 CRESTOR 40 mg tablet Generic drug:  rosuvastatin Dose:  40 mg Take 40 mg by mouth nightly. Refills:  0  
   
 glipiZIDE 10 mg tablet Commonly known as:  Shanice Searing Dose:  10 mg Take 10 mg by mouth daily. Refills:  0 PROAIR HFA 90 mcg/actuation inhaler Generic drug:  albuterol Dose:  2 Puff Take 2 Puffs by inhalation every six (6) hours as needed for Wheezing. Refills:  0 SPIRIVA WITH HANDIHALER 18 mcg inhalation capsule Generic drug:  tiotropium Dose:  1 Cap Take 1 Cap by inhalation daily. Refills:  0  
   
 traZODone 50 mg tablet Commonly known as:  [place] Dose:  50 mg Take 50 mg by mouth nightly. Refills:  0 WELLBUTRIN  mg SR tablet Generic drug:  buPROPion SR Dose:  150 mg Take 150 mg by mouth two (2) times a day. Refills:  0 Follow-up Information Follow up With Details Comments Contact Info Lito Gibson NP On 10/22/2018 Follow up appointment scheduled for October 22, 2018 at 12:00 p.m. (Noon). Lito Gibson  [facility] [address] [facility] [address] [place] [phone] Judy Mast MD   Patient can only remember the practice name and not the physician Discharge Instructions None Chart Review Routing History No Routing History on File

## 2018-10-12 NOTE — PROGRESS NOTES
Assumed care. VSS. Pt familiar with room, call bell and remote. Requesting for menu to order dinner. No distress noted or verbalized at this time.

## 2018-10-12 NOTE — PROGRESS NOTES
TRANSFER - IN REPORT: 
 
Verbal report received from YAMILETH Zambrano RN on Laurie Balls  being received from ED for routine progression of care Report consisted of patients Situation, Background, Assessment and  
Recommendations(SBAR). Information from the following report(s) SBAR, Kardex, Procedure Summary, Intake/Output, MAR, Recent Results and Med Rec Status was reviewed with the receiving nurse. On report, RN notified about overdue meds that need to be addressed. Nursing supervisor to follow up. Opportunity for questions and clarification was provided. Assessment will be completed upon patients arrival to unit and care assumed.

## 2018-10-12 NOTE — PROGRESS NOTES
NURSING HAS NOTED THAT PATIENT IS DROPPING HIS O2 SATS AND EXHIBITING PERIODS OF APNEA WHILE ASLEEP. WILL CONTACT ADMITTING PHYSICIAN TO OBTAIN CPAP ORDER FOR USE AT BEDTIME.

## 2018-10-13 LAB
AMPHET UR QL SCN: NEGATIVE
ANION GAP SERPL CALC-SCNC: 7 MMOL/L (ref 3–18)
BARBITURATES UR QL SCN: NEGATIVE
BASOPHILS # BLD: 0 K/UL (ref 0–0.1)
BASOPHILS NFR BLD: 0 % (ref 0–2)
BENZODIAZ UR QL: NEGATIVE
BUN SERPL-MCNC: 24 MG/DL (ref 7–18)
BUN/CREAT SERPL: 26 (ref 12–20)
CALCIUM SERPL-MCNC: 9.2 MG/DL (ref 8.5–10.1)
CANNABINOIDS UR QL SCN: NEGATIVE
CHLORIDE SERPL-SCNC: 103 MMOL/L (ref 100–108)
CK MB CFR SERPL CALC: NORMAL % (ref 0–4)
CK MB SERPL-MCNC: <1 NG/ML (ref 5–25)
CK SERPL-CCNC: 82 U/L (ref 39–308)
CO2 SERPL-SCNC: 29 MMOL/L (ref 21–32)
COCAINE UR QL SCN: NEGATIVE
CREAT SERPL-MCNC: 0.92 MG/DL (ref 0.6–1.3)
DIFFERENTIAL METHOD BLD: ABNORMAL
ECHO AO ASC DIAM: 2.76 CM
ECHO AO ROOT DIAM: 3.04 CM
ECHO AV AREA PEAK VELOCITY: 2.8 CM2
ECHO AV AREA VTI: 3 CM2
ECHO AV AREA/BSA PEAK VELOCITY: 1.2 CM2/M2
ECHO AV AREA/BSA VTI: 1.3 CM2/M2
ECHO AV MEAN GRADIENT: 4.5 MMHG
ECHO AV PEAK GRADIENT: 10 MMHG
ECHO AV PEAK VELOCITY: 158.16 CM/S
ECHO AV VTI: 31.92 CM
ECHO IVC PROX: 1.9 CM
ECHO LA MAJOR AXIS: 3.78 CM
ECHO LA VOL 2C: 64.05 ML (ref 18–58)
ECHO LA VOL 4C: 53.9 ML (ref 18–58)
ECHO LA VOL BP: 63.59 ML (ref 18–58)
ECHO LA VOL/BSA BIPLANE: 27.61 ML/M2
ECHO LA VOLUME INDEX A2C: 27.81 ML/M2
ECHO LA VOLUME INDEX A4C: 23.41 ML/M2
ECHO LV E' LATERAL VELOCITY: 1.2 CM/S
ECHO LV E' SEPTAL VELOCITY: 0.7 CM/S
ECHO LV EDV A2C: 99.6 ML
ECHO LV EDV A4C: 132 ML
ECHO LV EDV BP: 114.9 ML (ref 67–155)
ECHO LV EDV INDEX A4C: 57.3 ML/M2
ECHO LV EDV INDEX BP: 49.9 ML/M2
ECHO LV EDV NDEX A2C: 43.3 ML/M2
ECHO LV EJECTION FRACTION A2C: 70 %
ECHO LV EJECTION FRACTION A4C: 57 %
ECHO LV EJECTION FRACTION BIPLANE: 60.4 % (ref 55–100)
ECHO LV ESV A2C: 30.2 ML
ECHO LV ESV A4C: 57.2 ML
ECHO LV ESV BP: 45.5 ML (ref 22–58)
ECHO LV ESV INDEX A2C: 13.1 ML/M2
ECHO LV ESV INDEX A4C: 24.8 ML/M2
ECHO LV ESV INDEX BP: 19.8 ML/M2
ECHO LV INTERNAL DIMENSION DIASTOLIC: 4.82 CM (ref 4.2–5.9)
ECHO LV INTERNAL DIMENSION SYSTOLIC: 3.23 CM
ECHO LV IVSD: 1.17 CM (ref 0.6–1)
ECHO LV MASS 2D: 253.3 G (ref 88–224)
ECHO LV MASS INDEX 2D: 110 G/M2
ECHO LV POSTERIOR WALL DIASTOLIC: 1.18 CM (ref 0.6–1)
ECHO LVOT DIAM: 2.02 CM
ECHO LVOT PEAK GRADIENT: 8 MMHG
ECHO LVOT PEAK VELOCITY: 141.06 CM/S
ECHO LVOT VTI: 30.01 CM
ECHO MV A VELOCITY: 85.08 CM/S
ECHO MV AREA PHT: 3 CM2
ECHO MV E DECELERATION TIME (DT): 256.7 MS
ECHO MV E VELOCITY: 0.8 CM/S
ECHO MV E/A RATIO: 0.01
ECHO MV E/E' LATERAL: 0.67
ECHO MV E/E' RATIO (AVERAGED): 0.9
ECHO MV E/E' SEPTAL: 1.14
ECHO MV PRESSURE HALF TIME (PHT): 74.4 MS
ECHO RA AREA 4C: 12.46 CM2
ECHO RV INTERNAL DIMENSION: 3.46 CM
ECHO RV TAPSE: 2.1 CM (ref 1.5–2)
EOSINOPHIL # BLD: 0 K/UL (ref 0–0.4)
EOSINOPHIL NFR BLD: 0 % (ref 0–5)
ERYTHROCYTE [DISTWIDTH] IN BLOOD BY AUTOMATED COUNT: 13.3 % (ref 11.6–14.5)
FLUAV AG NPH QL IA: NEGATIVE
FLUBV AG NOSE QL IA: NEGATIVE
GLUCOSE BLD STRIP.AUTO-MCNC: 199 MG/DL (ref 70–110)
GLUCOSE BLD STRIP.AUTO-MCNC: 205 MG/DL (ref 70–110)
GLUCOSE BLD STRIP.AUTO-MCNC: 230 MG/DL (ref 70–110)
GLUCOSE BLD STRIP.AUTO-MCNC: 272 MG/DL (ref 70–110)
GLUCOSE SERPL-MCNC: 211 MG/DL (ref 74–99)
HCT VFR BLD AUTO: 47.3 % (ref 36–48)
HDSCOM,HDSCOM: NORMAL
HGB BLD-MCNC: 15.7 G/DL (ref 13–16)
LYMPHOCYTES # BLD: 0.7 K/UL (ref 0.9–3.6)
LYMPHOCYTES NFR BLD: 6 % (ref 21–52)
MAGNESIUM SERPL-MCNC: 2.2 MG/DL (ref 1.6–2.6)
MCH RBC QN AUTO: 32.5 PG (ref 24–34)
MCHC RBC AUTO-ENTMCNC: 33.2 G/DL (ref 31–37)
MCV RBC AUTO: 97.9 FL (ref 74–97)
METHADONE UR QL: NEGATIVE
MONOCYTES # BLD: 0.2 K/UL (ref 0.05–1.2)
MONOCYTES NFR BLD: 2 % (ref 3–10)
NEUTS SEG # BLD: 10.5 K/UL (ref 1.8–8)
NEUTS SEG NFR BLD: 92 % (ref 40–73)
OPIATES UR QL: NEGATIVE
PCP UR QL: NEGATIVE
PLATELET # BLD AUTO: 331 K/UL (ref 135–420)
PMV BLD AUTO: 10.5 FL (ref 9.2–11.8)
POTASSIUM SERPL-SCNC: 4.8 MMOL/L (ref 3.5–5.5)
RBC # BLD AUTO: 4.83 M/UL (ref 4.7–5.5)
SODIUM SERPL-SCNC: 139 MMOL/L (ref 136–145)
TROPONIN I SERPL-MCNC: <0.02 NG/ML (ref 0–0.06)
WBC # BLD AUTO: 11.4 K/UL (ref 4.6–13.2)

## 2018-10-13 PROCEDURE — 83735 ASSAY OF MAGNESIUM: CPT | Performed by: HOSPITALIST

## 2018-10-13 PROCEDURE — 82550 ASSAY OF CK (CPK): CPT | Performed by: HOSPITALIST

## 2018-10-13 PROCEDURE — 94640 AIRWAY INHALATION TREATMENT: CPT

## 2018-10-13 PROCEDURE — 77010033678 HC OXYGEN DAILY

## 2018-10-13 PROCEDURE — 82962 GLUCOSE BLOOD TEST: CPT

## 2018-10-13 PROCEDURE — 80307 DRUG TEST PRSMV CHEM ANLYZR: CPT | Performed by: HOSPITALIST

## 2018-10-13 PROCEDURE — 74011250636 HC RX REV CODE- 250/636: Performed by: HOSPITALIST

## 2018-10-13 PROCEDURE — 85025 COMPLETE CBC W/AUTO DIFF WBC: CPT | Performed by: HOSPITALIST

## 2018-10-13 PROCEDURE — 94760 N-INVAS EAR/PLS OXIMETRY 1: CPT

## 2018-10-13 PROCEDURE — 74011000250 HC RX REV CODE- 250: Performed by: HOSPITALIST

## 2018-10-13 PROCEDURE — 36415 COLL VENOUS BLD VENIPUNCTURE: CPT | Performed by: HOSPITALIST

## 2018-10-13 PROCEDURE — 74011636637 HC RX REV CODE- 636/637: Performed by: HOSPITALIST

## 2018-10-13 PROCEDURE — 65270000029 HC RM PRIVATE

## 2018-10-13 PROCEDURE — 97161 PT EVAL LOW COMPLEX 20 MIN: CPT

## 2018-10-13 PROCEDURE — 80048 BASIC METABOLIC PNL TOTAL CA: CPT | Performed by: HOSPITALIST

## 2018-10-13 PROCEDURE — 74011250637 HC RX REV CODE- 250/637: Performed by: HOSPITALIST

## 2018-10-13 PROCEDURE — 97165 OT EVAL LOW COMPLEX 30 MIN: CPT

## 2018-10-13 RX ORDER — INSULIN LISPRO 100 [IU]/ML
10 INJECTION, SOLUTION INTRAVENOUS; SUBCUTANEOUS
Status: DISCONTINUED | OUTPATIENT
Start: 2018-10-13 | End: 2018-10-15 | Stop reason: HOSPADM

## 2018-10-13 RX ADMIN — METHYLPREDNISOLONE SODIUM SUCCINATE 60 MG: 125 INJECTION, POWDER, FOR SOLUTION INTRAMUSCULAR; INTRAVENOUS at 22:28

## 2018-10-13 RX ADMIN — CEFTRIAXONE 1 G: 1 INJECTION, POWDER, FOR SOLUTION INTRAMUSCULAR; INTRAVENOUS at 14:17

## 2018-10-13 RX ADMIN — BUDESONIDE 500 MCG: 0.5 INHALANT RESPIRATORY (INHALATION) at 20:29

## 2018-10-13 RX ADMIN — METHYLPREDNISOLONE SODIUM SUCCINATE 60 MG: 125 INJECTION, POWDER, FOR SOLUTION INTRAMUSCULAR; INTRAVENOUS at 04:51

## 2018-10-13 RX ADMIN — BUPROPION HYDROCHLORIDE 150 MG: 150 TABLET, EXTENDED RELEASE ORAL at 09:00

## 2018-10-13 RX ADMIN — TRAZODONE HYDROCHLORIDE 50 MG: 50 TABLET ORAL at 22:27

## 2018-10-13 RX ADMIN — HEPARIN SODIUM 5000 UNITS: 5000 INJECTION INTRAVENOUS; SUBCUTANEOUS at 14:18

## 2018-10-13 RX ADMIN — GUAIFENESIN 600 MG: 600 TABLET, EXTENDED RELEASE ORAL at 22:28

## 2018-10-13 RX ADMIN — INSULIN LISPRO 10 UNITS: 100 INJECTION, SOLUTION INTRAVENOUS; SUBCUTANEOUS at 17:00

## 2018-10-13 RX ADMIN — HEPARIN SODIUM 5000 UNITS: 5000 INJECTION INTRAVENOUS; SUBCUTANEOUS at 04:51

## 2018-10-13 RX ADMIN — INSULIN LISPRO 6 UNITS: 100 INJECTION, SOLUTION INTRAVENOUS; SUBCUTANEOUS at 08:59

## 2018-10-13 RX ADMIN — METHYLPREDNISOLONE SODIUM SUCCINATE 60 MG: 125 INJECTION, POWDER, FOR SOLUTION INTRAMUSCULAR; INTRAVENOUS at 11:54

## 2018-10-13 RX ADMIN — ARFORMOTEROL TARTRATE 15 MCG: 15 SOLUTION RESPIRATORY (INHALATION) at 20:29

## 2018-10-13 RX ADMIN — ARFORMOTEROL TARTRATE 15 MCG: 15 SOLUTION RESPIRATORY (INHALATION) at 07:41

## 2018-10-13 RX ADMIN — BUDESONIDE 500 MCG: 0.5 INHALANT RESPIRATORY (INHALATION) at 07:41

## 2018-10-13 RX ADMIN — Medication 500 MG: at 09:00

## 2018-10-13 RX ADMIN — INSULIN LISPRO 3 UNITS: 100 INJECTION, SOLUTION INTRAVENOUS; SUBCUTANEOUS at 22:29

## 2018-10-13 RX ADMIN — INSULIN LISPRO 9 UNITS: 100 INJECTION, SOLUTION INTRAVENOUS; SUBCUTANEOUS at 11:30

## 2018-10-13 RX ADMIN — ROSUVASTATIN CALCIUM 40 MG: 10 TABLET, FILM COATED ORAL at 22:28

## 2018-10-13 RX ADMIN — BUPROPION HYDROCHLORIDE 150 MG: 150 TABLET, EXTENDED RELEASE ORAL at 22:28

## 2018-10-13 RX ADMIN — Medication 500 MG: at 22:27

## 2018-10-13 RX ADMIN — INSULIN LISPRO 6 UNITS: 100 INJECTION, SOLUTION INTRAVENOUS; SUBCUTANEOUS at 12:00

## 2018-10-13 RX ADMIN — HEPARIN SODIUM 5000 UNITS: 5000 INJECTION INTRAVENOUS; SUBCUTANEOUS at 22:28

## 2018-10-13 RX ADMIN — GUAIFENESIN 600 MG: 600 TABLET, EXTENDED RELEASE ORAL at 09:00

## 2018-10-13 RX ADMIN — SODIUM CHLORIDE 500 MG: 900 INJECTION, SOLUTION INTRAVENOUS at 12:06

## 2018-10-13 RX ADMIN — INSULIN LISPRO 6 UNITS: 100 INJECTION, SOLUTION INTRAVENOUS; SUBCUTANEOUS at 16:30

## 2018-10-13 NOTE — PROGRESS NOTES
Problem: Self Care Deficits Care Plan (Adult) Goal: *Acute Goals and Plan of Care (Insert Text) Occupational Therapy EVALUATION/discharge Patient: Dangelo Manning (31 y.o. male) Date: 10/13/2018 Primary Diagnosis: Pneumonia Precautions: activity as tolerated ASSESSMENT AND RECOMMENDATIONS: 
Based on the objective data described below, the patient presents with ability to perform functional mobility, functional transfers, and basic ADLs-toileting/LB dressing with independence. Pt was supine upon arrival. Pt did not have his Hearing aide in secondary to dead batter. Pt performed bed mobility independently. Pt donned sugar shoes independently. Pt performed in-room and bathroom mobility without AD and independently. Pt does not need skilled OT services at this time. Skilled occupational therapy is not indicated at this time. Discharge Recommendations: none Further Equipment Recommendations for Discharge: none SUBJECTIVE:  
Patient stated I'm ready to go.  OBJECTIVE DATA SUMMARY:  
 
Past Medical History:  
Diagnosis Date  COPD (chronic obstructive pulmonary disease) (HCC)  Diabetes ([facility]  Emphysema lung ([facility]  Hernia, abdominal   
 Hypercholesteremia Past Surgical History:  
Procedure Laterality Date  HX BACK SURGERY Barriers to Learning/Limitations: None Compensate with: visual, verbal, tactile, kinesthetic cues/model Prior Level of Function/Home Situation: Independent with ADLs/IADLs. No AD for mobility Home Situation Home Environment: Other (comment) (pt lives in his car) # Steps to Enter: 0 One/Two Story Residence: Other (Comment) Living Alone: Yes Support Systems: Friends \ neighbors Patient Expects to be Discharged to[de-identified] Other (comment) (tbd) Current DME Used/Available at Home: Hospital bed, None Cognitive/Behavioral Status: 
Neurologic State: Alert; Appropriate for age Orientation Level: Appropriate for age;Oriented X4 
 Cognition: Appropriate decision making; Appropriate for age attention/concentration; Appropriate safety awareness; Follows commands Safety/Judgement: Awareness of environment Skin: BUE skin intact Edema: none noted Coordination: 
Coordination: Within functional limits Fine Motor Skills-Upper: Left Intact; Right Intact Gross Motor Skills-Upper: Left Intact; Right Intact Strength: (B) UE Strength: Within functional limits Tone & Sensation:(B) UE Sensation: Intact Range of Motion:(B) UE 
AROM: Within functional limits Functional Mobility and Transfers for ADLs: 
Bed Mobility: 
 Supine to Sit: Independent Scooting: Independent Transfers: 
Sit to Stand: Independent Bathroom Mobility: Independent ADL Assessment/Intervention[de-identified] 
 Lower Body Dressing: Independent Toileting: Independent Cognitive Retraining Safety/Judgement: Awareness of environment Activity Tolerance:  
Pt tolerated treatment well no signs of fatigue or increase pain Please refer to the flowsheet for vital signs taken during this treatment. After treatment:  
[]  Patient left in no apparent distress sitting up in chair 
[x]  Patient sitting on EOB []  Patient left in no apparent distress in bed 
[x]  Call bell left within reach 
[]  Nursing notified 
[]  Caregiver present 
[]  CPM placed on  knee 
[]  Bed alarm activated COMMUNICATION/EDUCATION:  
Communication/Collaboration: 
[x]      Home safety education was provided and the patient/caregiver indicated understanding. [x]      Patient/family have participated as able and agree with findings and recommendations. []      Patient is unable to participate in plan of care at this time. Sirisha Minaya OT Time Calculation: 10 mins

## 2018-10-13 NOTE — PROGRESS NOTES
Problem: Mobility Impaired (Adult and Pediatric) Goal: *Acute Goals and Plan of Care (Insert Text) No goals needed as pt is independent w/ mobility. physical Therapy EVALUATION & Discharge Patient: Susanne James (24 y.o. male) Date: 10/13/2018 Primary Diagnosis: Pneumonia Precautions:     
 
ASSESSMENT AND RECOMMENDATIONS: 
Based on the objective data described below, the patient presents with mod Independent transfers and gt ability. No balance deficits or strength deficits noted w/ no pt c/o. Pt is [place] and lives in his [employer], parked at various friend driveways. Pt was able to perform all mobility requiring no A at this time. No further PT needed as pt is mod I/I. Pt left sitting EOB w/ all needs within reach. Nurse aware. Skilled physical therapy is not indicated at this time. Discharge Recommendations: None Further Equipment Recommendations for Discharge: N/A   
 
SUBJECTIVE:  
Patient stated I feel much better thank you.  OBJECTIVE DATA SUMMARY:  
 
Past Medical History:  
Diagnosis Date  COPD (chronic obstructive pulmonary disease) (HCC)  Diabetes ([facility]  [facility] lung ([facility]  Hernia, abdominal   
 Hypercholesteremia Past Surgical History:  
Procedure Laterality Date  HX BACK SURGERY Barriers to Learning/Limitations: None Compensate with: visual, verbal, tactile, kinesthetic cues/model Prior Level of Function/Home Situation:  
Home Situation Home Environment: Other (comment) (lives in own [employer]) # Steps to Enter: 0 One/Two Story Residence: Other (Comment) Living Alone: Yes Support Systems: Friends \ neighbors, [facility] Patient Expects to be Discharged to[de-identified] Unknown Current DME Used/Available at Home: None Critical Behavior: 
Neurologic State: Alert; Appropriate for age Orientation Level: Appropriate for age;Oriented X4 Cognition: Appropriate decision making; Appropriate for age attention/concentration; Appropriate safety awareness; Follows commands Safety/Judgement: Awareness of environment Psychosocial 
Patient Behaviors: Calm; Cooperative Purposeful Interaction: Yes Pt Identified Daily Priority: Clinical issues (comment); Social issues (comment) Car[employer] Process: Establish trust;Teaching/learning; Attend basic human needs;Create healing environment Caring Interventions: Reassure; Therapeutic modalities Reassure: Therapeutic listening; Informing Therapeutic Modalities: Deep breathing;Humor Strength:   
Strength: Within functional limits Tone & Sensation:  
Sensation: Intact Range Of Motion: 
AROM: Within functional limits Functional Mobility: 
Bed Mobility: 
Supine to Sit: Modified independent Sit to Supine: Modified independent Scooting: Modified independent Transfers: 
Sit to Stand: Modified independent Stand to Sit: Modified independent Balance:  
Sitting: Intact Standing: Intact Ambulation/Gait Training: 
Distance (ft): 30 Feet (ft) Assistive Device: Other (comment) (O2 support) Ambulation - Level of Assistance: Modified independent Gait Abnormalities: Decreased step clearance Base of Support: Narrowed Speed/Tiera: Pace decreased (<100 feet/min) Step Length: Left shortened;Right shortened Swing Pattern: Left symmetrical;Right symmetrical 
Interventions: Safety awareness training; Tactile cues; Verbal cues Therapeutic Exercises:  
Pain: 
Pain Scale 1: Numeric (0 - 10) Pain Intensity 1: 0 Activity Tolerance:  
Good Please refer to the flowsheet for vital signs taken during this treatment. After treatment:  
[x]         Patient left in no apparent distress sitting up EOB []         Patient left in no apparent distress in bed 
[x]         Call bell left within reach [x]         Nursing notified 
[]         Caregiver present 
[]         Bed alarm activated COMMUNICATION/EDUCATION:  
[x]         Fall prevention education was provided and the patient/caregiver indicated understanding. [x]         Patient/family have participated as able in goal setting and plan of care. []         Patient/family agree to work toward stated goals and plan of care. []         Patient understands intent and goals of therapy, but is neutral about his/her participation. []         Patient is unable to participate in goal setting and plan of care. Thank you for this referral. 
Gris Davila, PT Time Calculation: 10 mins

## 2018-10-13 NOTE — PROGRESS NOTES
Patient refused to get dual skin assessment, upon shift assessment patient has skin intact to the upper torso, bilateral arms, back has a small scar from 1994, but has no ulcers to the buttocks when patient turned to the side. He denies pain at this time. 1920hrs Bedside, Verbal and Written shift change report given to Kevin Duran RN (oncoming nurse) by Pantera Cardenas RN (offgoing nurse). Report included the following information SBAR, Kardex, Procedure Summary, Intake/Output, MAR, Accordion, Recent Results, Med Rec Status and Cardiac Rhythm NSR at 62. All questions answered

## 2018-10-13 NOTE — PROGRESS NOTES
Patient stated he does not want to wear the CPAP for the night.   RN made aware of patient's refusal.

## 2018-10-13 NOTE — ROUTINE PROCESS
Bedside and Verbal shift change report given to MONSE Irving (oncoming nurse) by Devonte Rodríguez RN 
(offgoing nurse). Report included the following information SBAR, Kardex, Intake/Output, MAR, Recent Results and Cardiac Rhythm NSR with some sinus frank. Jamie Nunez

## 2018-10-13 NOTE — PROGRESS NOTES
NUTRITION SCREENING Recommendations: Continue w/ POC 
 
RD ASSESSMENT/PLAN:  
 
Diet:  DM diet  Height: 5' 11\" (180.3 cm) Food Allergies: NKFA  Weight: 109.5 kg (241 lb 6.5 oz) PO Intake: 
Patient Vitals for the past 100 hrs: 
 % Diet Eaten 10/13/18 1115 100 % BMI: 33.7 kg/m^2 is  obese (30%-39.9% BMI) PMH: COPD, DM, emphysema lung, hemia, hypercholesteremia Current Hospital Problems: Pt admitted for PNA, COPD, chest pain like due to cough. No wt loss noted in chart hx  Nutrition intervention not currently indicated. Pt is not at nutritional risk at this time. Will rescreen per policy. REASON FOR ASSESSMENT:  
[]  RN Referral:  
 [x] MST score >/=2 Malnutrition Screening Tool (MST): 
Recently Lost Weight Without Trying: Unsure If Yes, How Much Weight Loss: Unsure Eating Poorly Due to Decreased Appetite: No 
MST Score: 4  
 [] Enteral/Parenteral Nutrition PTA [] Pregnant (Not in Labor):  [] Gestational DM     [] Multigestation 
 [] Pressure Ulcer/Wound Care needs 
[] Positive Nutrition Screen: 
[] BMI <18.5 [] NPO/Clear Liquid Diet > 5 days (>3 days in ICU) [] New Order for TPN 
[] LOS [] ICU admission 
 
 
Paul Urbano RD Pager: [phone]

## 2018-10-13 NOTE — PROGRESS NOTES
Bedside shift change report given to [address]. Report included the following information SBAR, Kardex, Intake/Output, MAR and Recent Results. Pt declined Dual skin assessment.

## 2018-10-13 NOTE — PROGRESS NOTES
Hospitalist Progress Note-critical care note Patient: Sophie Mcdaniel MRN: [id number]  CSN: [id number] YOB: 1960  Age: 60 y.o. Sex: male DOA: 10/12/2018 LOS:  LOS: 1 day Chief complaint: pna. Emphysema . Copd . DM Assessment/Plan Hospital Problems  Date Reviewed: 9/12/2018 Codes Class Noted POA * (Principal)Pneumonia ICD-10-CM: J18.9 ICD-9-CM: 481  10/12/2018 Unknown Emphysema lung ([facility] ICD-10-CM: J43.9 ICD-9-CM: 492.8  10/12/2018 Unknown Chest pain ICD-10-CM: R07.9 ICD-9-CM: 786.50  10/12/2018 Unknown Hard of hearing ICD-10-CM: H91.90 ICD-9-CM: 389.9  10/12/2018 Unknown Smoker ICD-10-CM: J74.002 ICD-9-CM: 305.1  10/12/2018 Unknown Obesity ICD-10-CM: E66.9 ICD-9-CM: 278.00  10/12/2018 Unknown COPD (chronic obstructive pulmonary disease) (HCC) ICD-10-CM: J44.9 ICD-9-CM: 492  9/12/2018 Yes DM (diabetes mellitus) ([facility] ICD-10-CM: E11.9 ICD-9-CM: 250.00  9/12/2018 Yes PNA Sob mild improving , need continue wean off nc O2 Continue Breathing tx, iv abx Symptoms treatment Flu screen-negative  
  
Copd-like exacerbation Elvia Perish Iv steroid-start weaning . Need smoking cessation  
  
Chest pain Like due to cough  
ce wnl ,  
Echo: Ef is 56 - 60%. Left ventricular mild concentric hypertrophy. Normal left ventricular wall motion, no regional wall motion abnormality noted Due to risk factor  
  
  
DM type II , with complication, 
-will increase  Lantus, and weaning off steroid ,  ssi, diabetic diet , hypoglycemia protocol   
   
Obesity  
  
Smoker Nicotine patch Hard hearing Disposition :2-3  days Subjective: still cough, sob better Nurse: no acute issue Review of systems: 
 
General: No fevers or chills. Cardiovascular: No chest pain or pressure. No palpitations. Pulmonary: No shortness of breath. Gastrointestinal: No nausea, vomiting. Vital signs/Intake and Output: 
Visit Vitals  /60  Pulse 63  Temp 97.7 °F (36.5 °C)  Resp 25  
 Ht 5' 11\" (1.803 m)  Wt 109.5 kg (241 lb 6.5 oz)  SpO2 93%  BMI 33.67 kg/m2 Current Shift:  10/13 0701 - 10/13 1900 In: 400 [P.O.:400] Out: - Last three shifts:    
 
Physical Exam: 
General: WD, WN. Alert, cooperative, no acute distress   
HEENT: NC, Atraumatic. PERRLA, anicteric sclerae. Lungs: No wheezing, increased expiration sounds Heart:  Regular  rhythm,  No murmur, No Rubs, No Gallops Abdomen: Soft, Non distended, Non tender.  +Bowel sounds, Extremities: No c/c/e Psych:   Not anxious or agitated. Neurologic:  No acute neurological deficit. Labs: Results:  
   
Chemistry Recent Labs 10/13/18 
 0230  10/12/18 [address] GLU  211*  89 NA  139  142  
K  4.8  3.9 CL  103  103 CO2  29  30 BUN  24*  20* CREA  0.92  1.02  
CA  9.2  9.2 AGAP  7  9 BUCR  26*  20  
AP   --   76  
TP   --   7.9 ALB   --   3.3*  
GLOB   --   4.6* AGRAT   --   0.7* CBC w/Diff Recent Labs 10/13/18 
 0230  10/12/18 [address] WBC  11.4  10.5 RBC  4.83  4.83  
HGB  15.7  15.9 HCT  47.3  46.9 PLT  331  325 GRANS  92*  71  
LYMPH  6*  20* EOS  0  1 Cardiac Enzymes Recent Labs 10/13/18 
 0230  10/12/18 
 1735 CPK  82  103 CKND1  CALCULATION NOT PERFORMED WHEN RESULT IS BELOW LINEAR LIMIT  CALCULATION NOT PERFORMED WHEN RESULT IS BELOW LINEAR LIMIT Coagulation No results for input(s): PTP, INR, APTT in the last 72 hours. No lab exists for component: INREXT Lipid Panel No results found for: CHOL, CHOLPOCT, CHOLX, CHLST, CHOLV, 135899, HDL, LDL, LDLC, DLDLP, 431897, VLDLC, VLDL, TGLX, TRIGL, TRIGP, TGLPOCT, CHHD, CHHDX  
BNP No results for input(s): BNPP in the last 72 hours. Liver Enzymes Recent Labs 10/12/18 [address] TP  7.9 ALB  3.3* AP  76 SGOT  19  
  
 Thyroid Studies No results found for: T4, T3U, TSH, TSHEXT Procedures/imaging: see electronic medical records for all procedures/Xrays and details which were not copied into this note but were reviewed prior to creation of Plan Allyson Nicole MD

## 2018-10-14 LAB
ANION GAP SERPL CALC-SCNC: 9 MMOL/L (ref 3–18)
BASOPHILS # BLD: 0 K/UL (ref 0–0.1)
BASOPHILS NFR BLD: 0 % (ref 0–3)
BUN SERPL-MCNC: 25 MG/DL (ref 7–18)
BUN/CREAT SERPL: 24 (ref 12–20)
CALCIUM SERPL-MCNC: 9.1 MG/DL (ref 8.5–10.1)
CHLORIDE SERPL-SCNC: 104 MMOL/L (ref 100–108)
CO2 SERPL-SCNC: 27 MMOL/L (ref 21–32)
CREAT SERPL-MCNC: 1.06 MG/DL (ref 0.6–1.3)
DIFFERENTIAL METHOD BLD: ABNORMAL
EOSINOPHIL # BLD: 0 K/UL (ref 0–0.4)
EOSINOPHIL NFR BLD: 0 % (ref 0–5)
ERYTHROCYTE [DISTWIDTH] IN BLOOD BY AUTOMATED COUNT: 13.2 % (ref 11.6–14.5)
GLUCOSE BLD STRIP.AUTO-MCNC: 187 MG/DL (ref 70–110)
GLUCOSE BLD STRIP.AUTO-MCNC: 198 MG/DL (ref 70–110)
GLUCOSE BLD STRIP.AUTO-MCNC: 211 MG/DL (ref 70–110)
GLUCOSE BLD STRIP.AUTO-MCNC: 257 MG/DL (ref 70–110)
GLUCOSE SERPL-MCNC: 191 MG/DL (ref 74–99)
HCT VFR BLD AUTO: 43.6 % (ref 36–48)
HGB BLD-MCNC: 14.7 G/DL (ref 13–16)
LYMPHOCYTES # BLD: 1.4 K/UL (ref 0.8–3.5)
LYMPHOCYTES NFR BLD: 7 % (ref 20–51)
MAGNESIUM SERPL-MCNC: 2.2 MG/DL (ref 1.6–2.6)
MCH RBC QN AUTO: 32.7 PG (ref 24–34)
MCHC RBC AUTO-ENTMCNC: 33.7 G/DL (ref 31–37)
MCV RBC AUTO: 97.1 FL (ref 74–97)
MONOCYTES # BLD: 0.4 K/UL (ref 0–1)
MONOCYTES NFR BLD: 2 % (ref 2–9)
NEUTS SEG # BLD: 17.7 K/UL (ref 1.8–8)
NEUTS SEG NFR BLD: 91 % (ref 42–75)
PLATELET # BLD AUTO: 329 K/UL (ref 135–420)
PMV BLD AUTO: 10.6 FL (ref 9.2–11.8)
POTASSIUM SERPL-SCNC: 4.3 MMOL/L (ref 3.5–5.5)
RBC # BLD AUTO: 4.49 M/UL (ref 4.7–5.5)
RBC MORPH BLD: ABNORMAL
SODIUM SERPL-SCNC: 140 MMOL/L (ref 136–145)
WBC # BLD AUTO: 19.5 K/UL (ref 4.6–13.2)

## 2018-10-14 PROCEDURE — 74011250636 HC RX REV CODE- 250/636: Performed by: HOSPITALIST

## 2018-10-14 PROCEDURE — 65270000029 HC RM PRIVATE

## 2018-10-14 PROCEDURE — 74011636637 HC RX REV CODE- 636/637: Performed by: HOSPITALIST

## 2018-10-14 PROCEDURE — 74011250637 HC RX REV CODE- 250/637: Performed by: HOSPITALIST

## 2018-10-14 PROCEDURE — 74011000250 HC RX REV CODE- 250: Performed by: HOSPITALIST

## 2018-10-14 PROCEDURE — 36415 COLL VENOUS BLD VENIPUNCTURE: CPT | Performed by: HOSPITALIST

## 2018-10-14 PROCEDURE — 83735 ASSAY OF MAGNESIUM: CPT | Performed by: HOSPITALIST

## 2018-10-14 PROCEDURE — 94640 AIRWAY INHALATION TREATMENT: CPT

## 2018-10-14 PROCEDURE — 82962 GLUCOSE BLOOD TEST: CPT

## 2018-10-14 PROCEDURE — 80048 BASIC METABOLIC PNL TOTAL CA: CPT | Performed by: HOSPITALIST

## 2018-10-14 PROCEDURE — 85025 COMPLETE CBC W/AUTO DIFF WBC: CPT | Performed by: HOSPITALIST

## 2018-10-14 PROCEDURE — 77010033678 HC OXYGEN DAILY

## 2018-10-14 RX ORDER — PREDNISONE 20 MG/1
40 TABLET ORAL
Status: DISCONTINUED | OUTPATIENT
Start: 2018-10-15 | End: 2018-10-15 | Stop reason: HOSPADM

## 2018-10-14 RX ADMIN — ROSUVASTATIN CALCIUM 40 MG: 10 TABLET, FILM COATED ORAL at 21:52

## 2018-10-14 RX ADMIN — TRAZODONE HYDROCHLORIDE 50 MG: 50 TABLET ORAL at 21:52

## 2018-10-14 RX ADMIN — INSULIN LISPRO 3 UNITS: 100 INJECTION, SOLUTION INTRAVENOUS; SUBCUTANEOUS at 08:44

## 2018-10-14 RX ADMIN — CEFTRIAXONE 1 G: 1 INJECTION, POWDER, FOR SOLUTION INTRAMUSCULAR; INTRAVENOUS at 12:57

## 2018-10-14 RX ADMIN — INSULIN LISPRO 10 UNITS: 100 INJECTION, SOLUTION INTRAVENOUS; SUBCUTANEOUS at 12:52

## 2018-10-14 RX ADMIN — Medication 500 MG: at 08:44

## 2018-10-14 RX ADMIN — INSULIN LISPRO 10 UNITS: 100 INJECTION, SOLUTION INTRAVENOUS; SUBCUTANEOUS at 08:45

## 2018-10-14 RX ADMIN — BUPROPION HYDROCHLORIDE 150 MG: 150 TABLET, EXTENDED RELEASE ORAL at 08:44

## 2018-10-14 RX ADMIN — INSULIN LISPRO 6 UNITS: 100 INJECTION, SOLUTION INTRAVENOUS; SUBCUTANEOUS at 21:51

## 2018-10-14 RX ADMIN — BUPROPION HYDROCHLORIDE 150 MG: 150 TABLET, EXTENDED RELEASE ORAL at 20:33

## 2018-10-14 RX ADMIN — GUAIFENESIN 600 MG: 600 TABLET, EXTENDED RELEASE ORAL at 20:33

## 2018-10-14 RX ADMIN — INSULIN LISPRO 9 UNITS: 100 INJECTION, SOLUTION INTRAVENOUS; SUBCUTANEOUS at 17:42

## 2018-10-14 RX ADMIN — HEPARIN SODIUM 5000 UNITS: 5000 INJECTION INTRAVENOUS; SUBCUTANEOUS at 12:52

## 2018-10-14 RX ADMIN — METHYLPREDNISOLONE SODIUM SUCCINATE 60 MG: 125 INJECTION, POWDER, FOR SOLUTION INTRAMUSCULAR; INTRAVENOUS at 08:43

## 2018-10-14 RX ADMIN — HEPARIN SODIUM 5000 UNITS: 5000 INJECTION INTRAVENOUS; SUBCUTANEOUS at 05:53

## 2018-10-14 RX ADMIN — ARFORMOTEROL TARTRATE 15 MCG: 15 SOLUTION RESPIRATORY (INHALATION) at 08:59

## 2018-10-14 RX ADMIN — INSULIN LISPRO 10 UNITS: 100 INJECTION, SOLUTION INTRAVENOUS; SUBCUTANEOUS at 17:42

## 2018-10-14 RX ADMIN — ARFORMOTEROL TARTRATE 15 MCG: 15 SOLUTION RESPIRATORY (INHALATION) at 20:09

## 2018-10-14 RX ADMIN — INSULIN LISPRO 3 UNITS: 100 INJECTION, SOLUTION INTRAVENOUS; SUBCUTANEOUS at 12:53

## 2018-10-14 RX ADMIN — Medication 500 MG: at 20:33

## 2018-10-14 RX ADMIN — HEPARIN SODIUM 5000 UNITS: 5000 INJECTION INTRAVENOUS; SUBCUTANEOUS at 21:51

## 2018-10-14 RX ADMIN — METHYLPREDNISOLONE SODIUM SUCCINATE 60 MG: 125 INJECTION, POWDER, FOR SOLUTION INTRAMUSCULAR; INTRAVENOUS at 20:33

## 2018-10-14 RX ADMIN — SODIUM CHLORIDE 500 MG: 900 INJECTION, SOLUTION INTRAVENOUS at 12:51

## 2018-10-14 RX ADMIN — GUAIFENESIN 600 MG: 600 TABLET, EXTENDED RELEASE ORAL at 08:44

## 2018-10-14 RX ADMIN — BUDESONIDE 500 MCG: 0.5 INHALANT RESPIRATORY (INHALATION) at 20:09

## 2018-10-14 RX ADMIN — BUDESONIDE 500 MCG: 0.5 INHALANT RESPIRATORY (INHALATION) at 08:59

## 2018-10-14 NOTE — PROGRESS NOTES
Hospitalist Progress Note-critical care note Patient: Zaida Major MRN: [id number]  CSN: [id number] YOB: 1960  Age: 60 y.o. Sex: male DOA: 10/12/2018 LOS:  LOS: 2 days Chief complaint: pna. Emphysema . Copd . DM Assessment/Plan Hospital Problems  Date Reviewed: 9/12/2018 Codes Class Noted POA * (Principal)Pneumonia ICD-10-CM: J18.9 ICD-9-CM: 912  10/12/2018 Unknown Emphysema lung ([facility] ICD-10-CM: J43.9 ICD-9-CM: 492.8  10/12/2018 Unknown Chest pain ICD-10-CM: R07.9 ICD-9-CM: 786.50  10/12/2018 Unknown Hard of hearing ICD-10-CM: H91.90 ICD-9-CM: 389.9  10/12/2018 Unknown Smoker ICD-10-CM: Q95.608 ICD-9-CM: 305.1  10/12/2018 Unknown Obesity ICD-10-CM: E66.9 ICD-9-CM: 278.00  10/12/2018 Unknown COPD (chronic obstructive pulmonary disease) (HCC) ICD-10-CM: J44.9 ICD-9-CM: 696  9/12/2018 Yes DM (diabetes mellitus) ([facility] ICD-10-CM: E11.9 ICD-9-CM: 250.00  9/12/2018 Yes PNA Sob mild improving , s Continue Breathing tx, iv abx Symptoms treatment Flu screen-negative  
  
Copd-like exacerbation Kwan Mary Carmen Iv steroid-start weaning . Need smoking cessation  
desat while falling into sleep Need O2 during sleep or napping Need sleep study as outpt for pedro 
  
Chest pain Like due to cough  
ce wnl ,  
Echo: Ef is 56 - 60%. Left ventricular mild concentric hypertrophy. Normal left ventricular wall motion, no regional wall motion abnormality noted Due to risk factor  
  
  
DM type II , with complication, 
-  Lantus, continue  weaning off steroid ,  ssi, diabetic diet , hypoglycemia protocol   
   
Obesity  
  
Smoker Nicotine patch Hard hearing Disposition :1-2 days Subjective: I lives in [facility], tomorrow Nurse: no acute issue Will have CM  On board. Review of systems: 
 
General: No fevers or chills. Cardiovascular: No chest pain or pressure. No palpitations. Pulmonary: No shortness of breath. Gastrointestinal: No nausea, vomiting. Vital signs/Intake and Output: 
Visit Vitals  /64  Pulse 65  Temp 98.6 °F (37 °C)  Resp 20  
 Ht 5' 11\" (1.803 m)  Wt 111.5 kg (245 lb 13 oz)  SpO2 94%  BMI 34.28 kg/m2 Current Shift:  10/14 0701 - 10/14 1900 In: 240 [P.O.:240] Out: - Last three shifts:  10/12 1901 - 10/14 0700 In: 65 [P.O.:400; I.V.:260] Out: - Physical Exam: 
General: WD, WN. Alert, cooperative, no acute distress   
HEENT: NC, Atraumatic. PERRLA, anicteric sclerae. Lungs: No wheezing, increased expiration sounds Heart:  Regular  rhythm,  No murmur, No Rubs, No Gallops Abdomen: Soft, Non distended, Non tender.  +Bowel sounds, Extremities: No c/c/e Psych:   Not anxious or agitated. Neurologic:  No acute neurological deficit. Labs: Results:  
   
Chemistry Recent Labs 10/14/18 
 0503  10/13/18 
 0230  10/12/18 [address] GLU  191*  211*  89 NA  140  139  142  
K  4.3  4.8  3.9 CL  104  103  103 CO2  27  29  30 BUN  25*  24*  20* CREA  1.06  0.92  1.02  
CA  9.1  9.2  9.2 AGAP  9  7  9 BUCR  24*  26*  20  
AP   --    --   76  
TP   --    --   7.9 ALB   --    --   3.3*  
GLOB   --    --   4.6* AGRAT   --    --   0.7* CBC w/Diff Recent Labs 10/14/18 
 0503  10/13/18 
 0230  10/12/18 [address] WBC  19.5*  11.4  10.5 RBC  4.49*  4.83  4.83  
HGB  14.7  15.7  15.9 HCT  43.6  47.3  46.9 PLT  329  331  325 GRANS  91*  92*  71  
LYMPH  7*  6*  20* EOS  0  0  1 Cardiac Enzymes Recent Labs 10/13/18 
 0230  10/12/18 
 1735 CPK  82  103 CKND1  CALCULATION NOT PERFORMED WHEN RESULT IS BELOW LINEAR LIMIT  CALCULATION NOT PERFORMED WHEN RESULT IS BELOW LINEAR LIMIT Coagulation No results for input(s): PTP, INR, APTT in the last 72 hours.  
 
No lab exists for component: INREXT, INREXT   
 Lipid Panel No results found for: CHOL, CHOLPOCT, CHOLX, CHLST, CHOLV, 308220, HDL, LDL, LDLC, DLDLP, 503511, VLDLC, VLDL, TGLX, TRIGL, TRIGP, TGLPOCT, CHHD, CHHDX  
BNP No results for input(s): BNPP in the last 72 hours. Liver Enzymes Recent Labs 10/12/18 [address] TP  7.9 ALB  3.3* AP  76 SGOT  19 Thyroid Studies No results found for: T4, T3U, TSH, TSHEXT, TSHEXT Procedures/imaging: see electronic medical records for all procedures/Xrays and details which were not copied into this note but were reviewed prior to creation of Plan Allyson Nicole MD

## 2018-10-14 NOTE — ROUTINE PROCESS
19:30: Received verbal/bedside change of shift report from Luz Lazo RN. Report included SBAR, KARDEX, MAR and recent results. 20:15: Received pt resting quietly in bed with TV on and in NAD. Pt had just received his scheduled nebulizer TX and was coughing intensely at this time, however cough was non-productive and lungs present as CDI. Pt denies physical C/O at this time, although he voiced C/O quality of food served, environmental conditions in general etc. 
 
22:29: FSBS registers as being 199 mg/dl at this time. 3 units sliding scale insulin coverage provided. 05:30: Scheduled heparin given at this time. Pt has slept soundly entire shift overnight and appears to be in NAD. Denies C/O at this time. Bedside, Verbal and Written shift change report given to Luz Lazo RN (oncoming nurse) by Jodi Hughes. Romeo Gerard (offgoing nurse). Report included the following information SBAR, Kardex, MAR and Recent Results.

## 2018-10-14 NOTE — PROGRESS NOTES
Patient alert and oriented x4, NSR at 2L nasal canula Oxygen saturation at 92%. Patient denies pain, refused to shower. 1045hrs Telemetry called to notify patient had oxygen level at 85%. He was assessed and he is at NSR at 2L nasal canula Oxygen saturation at 99% at this time Heart rate:72. 
 
1915hrs Bedside, Verbal and Written shift change report given to Sona Rodgers RN (oncoming nurse) by Dennys Salas RN (offgoing nurse). Report included the following information SBAR, Kardex, Procedure Summary, Intake/Output, MAR, Accordion, Recent Results and Med Rec Status. All questions answered.

## 2018-10-14 NOTE — PROGRESS NOTES
AS NOTED PREVIOUSLY, PATIENT DECLINES CPAP FOR USE WHILE ASLEEP. DURING THIS AM NEB TX, PATIENT'S SAT WAS INTIALLY 94% ON 1.5L NC.  AS HE BEGAN TO DOZE OFF, DESATURATED TO 76% AND RESPIRATORY RATE CLIMBED TO 36. STIMULATED PATIENT AND HIS SATS JIM TO 89%. INFORMED DR. Wilver Wylie OF EVENTS. PATIENT REMAINS ON CONT PULSE OXIMETRY.

## 2018-10-14 NOTE — PROGRESS NOTES
Problem: Falls - Risk of 
Goal: *Absence of Falls Document Candelario Melgar Fall Risk and appropriate interventions in the flowsheet. Outcome: Progressing Towards Goal 
Fall Risk Interventions: 
Mobility Interventions: Patient to call before getting OOB Medication Interventions: Patient to call before getting OOB, Teach patient to arise slowly Elimination Interventions: Call light in reach, Urinal in reach, Toileting schedule/hourly rounds, Patient to call for help with toileting needs, Toilet paper/wipes in reach History of Falls Interventions: Door open when patient unattended

## 2018-10-14 NOTE — PROGRESS NOTES
Chart reviewed as CM on call. Pt admitted for pneumonia. Per MD Marybeth Jose, pt may be able to dc tomorrow. Nursing, please conduct and chart walk test and notify CM of results. Pt has hx COPD. Pt will need outpatient sleep study. CM will cont to follow.

## 2018-10-15 VITALS
OXYGEN SATURATION: 93 % | RESPIRATION RATE: 20 BRPM | TEMPERATURE: 98.3 F | BODY MASS INDEX: 34.44 KG/M2 | HEART RATE: 62 BPM | DIASTOLIC BLOOD PRESSURE: 67 MMHG | WEIGHT: 246.03 LBS | HEIGHT: 71 IN | SYSTOLIC BLOOD PRESSURE: 122 MMHG

## 2018-10-15 LAB
ANION GAP SERPL CALC-SCNC: 6 MMOL/L (ref 3–18)
BASOPHILS # BLD: 0 K/UL (ref 0–0.1)
BASOPHILS NFR BLD: 0 % (ref 0–2)
BUN SERPL-MCNC: 28 MG/DL (ref 7–18)
BUN/CREAT SERPL: 25 (ref 12–20)
CALCIUM SERPL-MCNC: 8.9 MG/DL (ref 8.5–10.1)
CHLORIDE SERPL-SCNC: 105 MMOL/L (ref 100–108)
CO2 SERPL-SCNC: 29 MMOL/L (ref 21–32)
CREAT SERPL-MCNC: 1.11 MG/DL (ref 0.6–1.3)
DIFFERENTIAL METHOD BLD: ABNORMAL
EOSINOPHIL # BLD: 0 K/UL (ref 0–0.4)
EOSINOPHIL NFR BLD: 0 % (ref 0–5)
ERYTHROCYTE [DISTWIDTH] IN BLOOD BY AUTOMATED COUNT: 13.5 % (ref 11.6–14.5)
GLUCOSE BLD STRIP.AUTO-MCNC: 177 MG/DL (ref 70–110)
GLUCOSE BLD STRIP.AUTO-MCNC: 213 MG/DL (ref 70–110)
GLUCOSE SERPL-MCNC: 183 MG/DL (ref 74–99)
HCT VFR BLD AUTO: 45 % (ref 36–48)
HGB BLD-MCNC: 14.8 G/DL (ref 13–16)
LYMPHOCYTES # BLD: 0.9 K/UL (ref 0.9–3.6)
LYMPHOCYTES NFR BLD: 5 % (ref 21–52)
MAGNESIUM SERPL-MCNC: 2.2 MG/DL (ref 1.6–2.6)
MCH RBC QN AUTO: 32.1 PG (ref 24–34)
MCHC RBC AUTO-ENTMCNC: 32.9 G/DL (ref 31–37)
MCV RBC AUTO: 97.6 FL (ref 74–97)
MONOCYTES # BLD: 0.6 K/UL (ref 0.05–1.2)
MONOCYTES NFR BLD: 3 % (ref 3–10)
NEUTS SEG # BLD: 15.8 K/UL (ref 1.8–8)
NEUTS SEG NFR BLD: 92 % (ref 40–73)
PLATELET # BLD AUTO: 325 K/UL (ref 135–420)
PMV BLD AUTO: 10.7 FL (ref 9.2–11.8)
POTASSIUM SERPL-SCNC: 4.8 MMOL/L (ref 3.5–5.5)
RBC # BLD AUTO: 4.61 M/UL (ref 4.7–5.5)
SODIUM SERPL-SCNC: 140 MMOL/L (ref 136–145)
WBC # BLD AUTO: 17.4 K/UL (ref 4.6–13.2)

## 2018-10-15 PROCEDURE — 36415 COLL VENOUS BLD VENIPUNCTURE: CPT | Performed by: HOSPITALIST

## 2018-10-15 PROCEDURE — 74011250636 HC RX REV CODE- 250/636: Performed by: HOSPITALIST

## 2018-10-15 PROCEDURE — 82962 GLUCOSE BLOOD TEST: CPT

## 2018-10-15 PROCEDURE — 94640 AIRWAY INHALATION TREATMENT: CPT

## 2018-10-15 PROCEDURE — 74011000250 HC RX REV CODE- 250: Performed by: HOSPITALIST

## 2018-10-15 PROCEDURE — 83735 ASSAY OF MAGNESIUM: CPT | Performed by: HOSPITALIST

## 2018-10-15 PROCEDURE — 74011250637 HC RX REV CODE- 250/637: Performed by: HOSPITALIST

## 2018-10-15 PROCEDURE — 80048 BASIC METABOLIC PNL TOTAL CA: CPT | Performed by: HOSPITALIST

## 2018-10-15 PROCEDURE — 85025 COMPLETE CBC W/AUTO DIFF WBC: CPT | Performed by: HOSPITALIST

## 2018-10-15 PROCEDURE — 74011636637 HC RX REV CODE- 636/637: Performed by: HOSPITALIST

## 2018-10-15 PROCEDURE — 94760 N-INVAS EAR/PLS OXIMETRY 1: CPT

## 2018-10-15 RX ORDER — DOXYCYCLINE 100 MG/1
100 CAPSULE ORAL 2 TIMES DAILY
Qty: 14 CAP | Refills: 0 | Status: SHIPPED | OUTPATIENT
Start: 2018-10-15 | End: 2018-10-22

## 2018-10-15 RX ORDER — PREDNISONE 5 MG/1
TABLET ORAL
Qty: 21 TAB | Refills: 0 | Status: SHIPPED | OUTPATIENT
Start: 2018-10-15

## 2018-10-15 RX ADMIN — BUPROPION HYDROCHLORIDE 150 MG: 150 TABLET, EXTENDED RELEASE ORAL at 08:42

## 2018-10-15 RX ADMIN — Medication 500 MG: at 08:41

## 2018-10-15 RX ADMIN — INSULIN LISPRO 3 UNITS: 100 INJECTION, SOLUTION INTRAVENOUS; SUBCUTANEOUS at 08:42

## 2018-10-15 RX ADMIN — INSULIN LISPRO 10 UNITS: 100 INJECTION, SOLUTION INTRAVENOUS; SUBCUTANEOUS at 08:42

## 2018-10-15 RX ADMIN — PREDNISONE 40 MG: 20 TABLET ORAL at 08:41

## 2018-10-15 RX ADMIN — BUDESONIDE 500 MCG: 0.5 INHALANT RESPIRATORY (INHALATION) at 07:19

## 2018-10-15 RX ADMIN — HEPARIN SODIUM 5000 UNITS: 5000 INJECTION INTRAVENOUS; SUBCUTANEOUS at 06:49

## 2018-10-15 RX ADMIN — GUAIFENESIN 600 MG: 600 TABLET, EXTENDED RELEASE ORAL at 08:42

## 2018-10-15 RX ADMIN — ARFORMOTEROL TARTRATE 15 MCG: 15 SOLUTION RESPIRATORY (INHALATION) at 07:19

## 2018-10-15 NOTE — PROGRESS NOTES
Problem: Falls - Risk of 
Goal: *Absence of Falls Document Shyanne Peterson Fall Risk and appropriate interventions in the flowsheet. Outcome: Progressing Towards Goal 
Fall Risk Interventions: 
Mobility Interventions: Patient to call before getting OOB Medication Interventions: Patient to call before getting OOB, Teach patient to arise slowly Elimination Interventions: Call light in reach History of Falls Interventions: Door open when patient unattended

## 2018-10-15 NOTE — ROUTINE PROCESS
19:30: Received verbal/beside change of shift report from Quinton Rouse RN. Report included SBAR, KARDEX, MAR and recent results. 20:00: Received pt resting quietly in bed while watching TV and in NAD. Pt just received his scheduled nebulizer TX and was coughing intensely at this time. 02 via NC infusing at 2 L/M. Telemetry box #39 transmitting SR. Pt denies C/O physical discomfort, but launched into a diatribe against the poor quality of food served here, noise level during the day etc. It should be noted that pt praised all staff members in general, verbalizing '[facility] are a damn site better than [place], you guys are wonderful and they suck! \" Monitoring ongoing. 21:51: Pt received S/S insulin coverage of 6 units humalog for FSBS of 211 mg/dl. 06:30: Pt slept soundly remainder of shift following above appended note. Denies C/O at this time. Bedside, Verbal and Written shift change report given to ELVA Younger (oncoming nurse) by Zacarias Hurtado. Jaycob Almanza (offgoing nurse). Report included the following information SBAR, Kardex, MAR and Recent Results.

## 2018-10-15 NOTE — PROGRESS NOTES
D/C Plan: Physician follow up Noted walk test has been conducted. Pt's O2 sats did not drop below 92% on RA. Given this pt does not qualify for home O2. Anticipate pt will be discharged with in the next 24-48 hours with physician follow up. CM remains available to assist as needed. 1132: CM met with pt at bedside to discuss transition of care options to include [facility] and [facility]. Pt has declined these services. Pt indicated his [place] is in the parking lot and he will transport himself back to his friend's home. No other transition of care needs have been identified. Care Management Interventions Mode of Transport at Discharge: Self Transition of Care Consult (CM Consult): Discharge Planning Health Maintenance Reviewed: Yes Physical Therapy Consult: Yes Occupational Therapy Consult: Yes Current Support Network: Other (lives in his [place] on friend's property) Confirm Follow Up Transport: Self

## 2018-10-15 NOTE — DISCHARGE SUMMARY
Discharge Summary    Patient: Yuri Thrasher MRN: [id number]  CSN: [id number]    YOB: 1960  Age: 60 y.o. Sex: male    DOA: 10/12/2018 LOS:  LOS: 3 days   Discharge Date:      Primary Care Provider:  Judy Mast MD    Admission Diagnoses: Pneumonia    Discharge Diagnoses:    Hospital Problems  Date Reviewed: 9/12/2018          Codes Class Noted POA    * (Principal)Pneumonia ICD-10-CM: J18.9  ICD-9-CM: 760  10/12/2018 Unknown        Emphysema lung ([facility] ICD-10-CM: J43.9  ICD-9-CM: 492.8  10/12/2018 Unknown        Chest pain ICD-10-CM: R07.9  ICD-9-CM: 786.50  10/12/2018 Unknown        Hard of hearing ICD-10-CM: H91.90  ICD-9-CM: 389.9  10/12/2018 Unknown        Smoker ICD-10-CM: F17.200  ICD-9-CM: 305.1  10/12/2018 Unknown        Obesity ICD-10-CM: E66.9  ICD-9-CM: 278.00  10/12/2018 Unknown        COPD (chronic obstructive pulmonary disease) ([facility] ICD-10-CM: J44.9  ICD-9-CM: 131  9/12/2018 Yes        DM (diabetes mellitus) ([facility] ICD-10-CM: E11.9  ICD-9-CM: 250.00  9/12/2018 Yes              Discharge Condition: Stable    Discharge Medications:     Current Discharge Medication List      START taking these medications    Details   predniSONE (STERAPRED) 5 mg dose pack See administration instruction per 5mg dose pack  Qty: 21 Tab, Refills: 0      doxycycline (MONODOX) 100 mg capsule Take 1 Cap by mouth two (2) times a day for 7 days. Qty: 14 Cap, Refills: 0         CONTINUE these medications which have NOT CHANGED    Details   fluticasone-salmeterol (ADVAIR DISKUS) 250-50 mcg/dose diskus inhaler Take 1 Puff by inhalation every twelve (12) hours. rosuvastatin (CRESTOR) 40 mg tablet Take 40 mg by mouth nightly. glipiZIDE (GLUCOTROL) 10 mg tablet Take 10 mg by mouth daily. buPROPion SR (WELLBUTRIN SR) 150 mg SR tablet Take 150 mg by mouth two (2) times a day. traZODone (DESYREL) 50 mg tablet Take 50 mg by mouth nightly.       albuterol (PROAIR HFA) 90 mcg/actuation inhaler Take 2 Puffs by inhalation every six (6) hours as needed for Wheezing. tiotropium (SPIRIVA WITH HANDIHALER) 18 mcg inhalation capsule Take 1 Cap by inhalation daily. Procedures : none     Consults: None      PHYSICAL EXAM   Visit Vitals    /67 (BP 1 Location: Left arm, BP Patient Position: At rest)    Pulse 68    Temp 98 °F (36.7 °C)    Resp 20    Ht 5' 11\" (1.803 m)    Wt 111.6 kg (246 lb 0.5 oz)    SpO2 95%    BMI 34.31 kg/m2     General: Awake, cooperative, no acute distress    HEENT: NC, Atraumatic. PERRLA, EOMI. Anicteric sclerae. Lungs:  CTA Bilaterally. No Wheezing/Rhonchi/Rales. Heart:  Regular  rhythm,  No murmur, No Rubs, No Gallops  Abdomen: Soft, Non distended, Non tender. +Bowel sounds,   Extremities: No c/c/e  Psych:   Not anxious or agitated. Neurologic:  No acute neurological deficits. Admission HPI :   Ham Browne is a 60 y.o. male who hx of copd, dm, emphysema came to er due chest pain and cough for 2 days. The pain located in epigastric area, after cough, reported sweats a lot, not check T. Associated with  Productive cough and wheezing, mild sob . He is current a smoker. cxr indicated :Pneumonic infiltrate involving the right lower lung. Denies any slurred speech/headache/n/v/blurred vission/d/c/palpitation/gait change/bleeding. Denies  any alcohol or drug use. Hospital Course :     Pt  was admitted and treated for PNA and copd exacerbation   Pna:her symptoms resolving on discharge. During his stay, she received rocephin/azithromycine per iv with Breathing tx, and symptoms treatment. He will d/c home with po abx and steroid tapering. He has inhaler at home.       On  admission, he also presented copd exacerbation. Iv steroid was administrated with breathing treatment. He was off nc O2 on discharge . Smoking cessation was strongly recommended .  He needs to f/u with pulm for sleep study to r/o oas -need referral from pcm We also controlled his DM per insulin during his stay . Activity: Activity as tolerated    Diet: Diabetic Diet    [facility]     Disposition: home     Minutes spent on discharge: 45 min       Labs: Results:       Chemistry Recent Labs      10/15/18   0411  10/14/18   0503  10/13/18   0230   GLU  183*  191*  211*   NA  140  140  139   K  4.8  4.3  4.8   CL  105  104  103   CO2  29  27  29   BUN  28*  25*  24*   CREA  1.11  1.06  0.92   CA  8.9  9.1  9.2   AGAP  6  9  7   BUCR  25*  24*  26*      CBC w/Diff Recent Labs      10/15/18   0411  10/14/18   0503  10/13/18   0230   WBC  17.4*  19.5*  11.4   RBC  4.61*  4.49*  4.83   HGB  14.8  14.7  15.7   HCT  45.0  43.6  47.3   PLT  325  329  331   GRANS  92*  91*  92*   LYMPH  5*  7*  6*   EOS  0  0  0      Cardiac Enzymes Recent Labs      10/13/18   0230  10/12/18   1735   CPK  82  103   CKND1  CALCULATION NOT PERFORMED WHEN RESULT IS BELOW LINEAR LIMIT  CALCULATION NOT PERFORMED WHEN RESULT IS BELOW LINEAR LIMIT      Coagulation No results for input(s): PTP, INR, APTT in the last 72 hours. No lab exists for component: INREXT    Lipid Panel No results found for: CHOL, CHOLPOCT, CHOLX, CHLST, CHOLV, 473897, HDL, LDL, LDLC, DLDLP, 869215, VLDLC, VLDL, TGLX, TRIGL, TRIGP, TGLPOCT, CHHD, CHHDX   BNP No results for input(s): BNPP in the last 72 hours. Liver Enzymes No results for input(s): TP, ALB, TBIL, AP, SGOT, GPT in the last 72 hours. No lab exists for component: DBIL   Thyroid Studies No results found for: T4, T3U, TSH, TSHEXT         Significant Diagnostic Studies: Xr Chest Port    Result Date: 10/12/2018  EXAM: One view chest x-ray CLINICAL INDICATION/HISTORY: Chest pain and dyspnea. COMPARISON: 09/12/2018. TECHNIQUE: Single AP view of the chest was obtained. _______________ FINDINGS: HEART, VESSELS, MEDIASTINUM: Heart size is normal. No vascular congestion. LUNGS, PLEURAL SPACES: Small, new infiltrate involving the right lower lung.  Left lung is clear. No effusion or pneumothorax. BONY THORAX, SOFT TISSUES: Unremarkable. _______________     IMPRESSION: Pneumonic infiltrate involving the right lower lung. Please consider repeat imaging following a course of treatment to ensure complete resolution.             [facility]     CC: Judy Mast MD

## 2018-10-26 LAB
ATRIAL RATE: 64 BPM
CALCULATED P AXIS, ECG09: 45 DEGREES
CALCULATED R AXIS, ECG10: 77 DEGREES
CALCULATED T AXIS, ECG11: 77 DEGREES
DIAGNOSIS, 93000: NORMAL
P-R INTERVAL, ECG05: 130 MS
Q-T INTERVAL, ECG07: 408 MS
QRS DURATION, ECG06: 86 MS
QTC CALCULATION (BEZET), ECG08: 420 MS
VENTRICULAR RATE, ECG03: 64 BPM

## 2018-12-22 LAB
ATRIAL RATE: 74 BPM
CALCULATED P AXIS, ECG09: 60 DEGREES
CALCULATED R AXIS, ECG10: 73 DEGREES
CALCULATED T AXIS, ECG11: 71 DEGREES
DIAGNOSIS, 93000: NORMAL
P-R INTERVAL, ECG05: 126 MS
Q-T INTERVAL, ECG07: 370 MS
QRS DURATION, ECG06: 74 MS
QTC CALCULATION (BEZET), ECG08: 410 MS
VENTRICULAR RATE, ECG03: 74 BPM

## 2019-02-04 ENCOUNTER — HOSPITAL ENCOUNTER (EMERGENCY)
Age: 59
Discharge: HOME | End: 2019-02-04
Attending: EMERGENCY MEDICINE
Payer: MEDICAID

## 2019-02-04 VITALS
BODY MASS INDEX: 35.84 KG/M2 | DIASTOLIC BLOOD PRESSURE: 61 MMHG | RESPIRATION RATE: 16 BRPM | OXYGEN SATURATION: 99 % | HEART RATE: 79 BPM | TEMPERATURE: 97.9 F | HEIGHT: 71 IN | SYSTOLIC BLOOD PRESSURE: 145 MMHG | WEIGHT: 256 LBS

## 2019-02-04 DIAGNOSIS — L91.8 SKIN TAG: Primary | ICD-10-CM

## 2019-02-04 PROCEDURE — 99282 EMERGENCY DEPT VISIT SF MDM: CPT

## 2019-02-04 NOTE — ED PROVIDER NOTES
EMERGENCY DEPARTMENT HISTORY AND PHYSICAL EXAM 
 
Date: 2/4/2019 Patient Name: Adwoa Walker History of Presenting Illness Chief Complaint Patient presents with  
 Skin Problem History Provided By: Patient Chief Complaint: Back pain Duration: <1 Days Timing:  Acute Location: Back Quality: Stabbing Severity: 3 out of 10 Associated Symptoms: Skin tag Additional History (Context):  
9:58 AM 
Adwoa Walker is a 60 y.o. male with PMHX COPD, DM, HLD, emphysema, abdominal hernia presents to the emergency department C/O stabbing back pain (3/10) onset last night. Associated symptoms include a skin tag on the back. He states that he noticed a stabbing pain in his back when he was in bed, he looked at the area in the mirror and realized \"there is something growing off my back. \" His PCP is Dr. Kalin Elaine. Allergic to Oxycodone. Pt denies fever and any other sxs or complaints. PCP: Other, MD Judy 
 
Current Outpatient Medications Medication Sig Dispense Refill  predniSONE (STERAPRED) 5 mg dose pack See administration instruction per 5mg dose pack 21 Tab 0  
 fluticasone-salmeterol (ADVAIR DISKUS) 250-50 mcg/dose diskus inhaler Take 1 Puff by inhalation every twelve (12) hours.  rosuvastatin (CRESTOR) 40 mg tablet Take 40 mg by mouth nightly.  glipiZIDE (GLUCOTROL) 10 mg tablet Take 10 mg by mouth daily.  buPROPion SR (WELLBUTRIN SR) 150 mg SR tablet Take 150 mg by mouth two (2) times a day.  traZODone (DESYREL) 50 mg tablet Take 50 mg by mouth nightly.  albuterol (PROAIR HFA) 90 mcg/actuation inhaler Take 2 Puffs by inhalation every six (6) hours as needed for Wheezing.  tiotropium (SPIRIVA WITH HANDIHALER) 18 mcg inhalation capsule Take 1 Cap by inhalation daily. Past History Past Medical History: 
Past Medical History:  
Diagnosis Date  COPD (chronic obstructive pulmonary disease) (HCC)  Diabetes ([facility]  Emphysema lung ([address]  Hernia, abdominal   
 Hypercholesteremia Past Surgical History: 
Past Surgical History:  
Procedure Laterality Date  HX BACK SURGERY Family History: 
History reviewed. No pertinent family history. Social History: 
Social History Tobacco Use  Smoking status: Current Every Day Smoker Packs/day: 0.50  Smokeless tobacco: Never Used Substance Use Topics  Alcohol use: No  
 Drug use: No  
 
 
Allergies: Allergies Allergen Reactions  Oxycodone Anaphylaxis Review of Systems Review of Systems Constitutional: Negative for fever. Musculoskeletal: Positive for back pain. Skin:  
     (+) Skin tag All other systems reviewed and are negative. Physical Exam  
 
Vitals:  
 02/04/19 0945 BP: 145/61 Pulse: 79 Resp: 16 Temp: 97.9 °F (36.6 °C) SpO2: 99% Weight: 116.1 kg (256 lb) Height: 5' 11\" (1.803 m) Physical Exam  
Constitutional: He is oriented to person, place, and time. chronically ill appearing, NAD HENT:  
Head: Normocephalic and atraumatic. Eyes: Conjunctivae are normal.  
Neck: Normal range of motion. Neck supple. Pulmonary/Chest: Effort normal.  
Musculoskeletal: Normal range of motion. Neurological: He is alert and oriented to person, place, and time. Skin: Skin is warm and dry. Nursing note and vitals reviewed. Diagnostic Study Results Labs: 
 No results found for this or any previous visit (from the past 12 hour(s)). Radiologic Studies: No orders to display CT Results  (Last 48 hours) None CXR Results  (Last 48 hours) None Medical Decision Making I am the first provider for this patient. I reviewed the vital signs, available nursing notes, past medical history, past surgical history, family history and social history. Vital Signs: Reviewed the patient's vital signs. Pulse Oximetry Analysis: 99% on RA Records Reviewed: Nursing Notes Procedures: 
Procedures ED Course:  
9:58 AM Initial assessment performed. The patients presenting problems have been discussed, and they are in agreement with the care plan formulated and outlined with them. I have encouraged them to ask questions as they arise throughout their visit. Provider Notes (Medical Decision Making): Pt presents for pain in his left back after moving out of bed and putting a shirt on. Pt has a large intact skin tag with no signs of infection. Skin tag covered and pt given return precautions, he will follow up with his Primary and possibly Dermatology for removal. Pt understands reasons to return and is offering no questions or concerns. Diagnosis and Disposition DISCHARGE NOTE: 
10:02 AM 
Judith Smith's  results have been reviewed with him. He has been counseled regarding his diagnosis, treatment, and plan. He verbally conveys understanding and agreement of the signs, symptoms, diagnosis, treatment and prognosis and additionally agrees to follow up as discussed. He also agrees with the care-plan and conveys that all of his questions have been answered. I have also provided discharge instructions for him that include: educational information regarding their diagnosis and treatment, and list of reasons why they would want to return to the ED prior to their follow-up appointment, should his condition change. He has been provided with education for proper emergency department utilization. CLINICAL IMPRESSION: 
 
1. Skin tag PLAN: 
1. D/C Home 2. Discharge Medication List as of 2/4/2019 10:04 AM  
  
 
3. Follow-up Information Follow up With Specialties Details Why Contact Osbaldo Arnold NP Nurse Practitioner Schedule an appointment as soon as possible for a visit in 2 days Follow up with Primary Care. [address] [facility] [address] [id number] [facility] 
[phone] [facility] EMERGENCY DEPT Emergency Medicine Go to As needed, If symptoms worsen [address] [phone] 
[phone]  
  
 
___________________________ Attestations:  
 
SCRIBE ATTESTATION: 
This note is prepared by Juliana Zaldivar, acting as Scribe for MELY Hernandez. 
 
PROVIDER ATTESTATION: 
[employer], NP: The scribe's documentation has been prepared under my direction and personally reviewed by me in its entirety. I confirm that the note above accurately reflects all work, treatment, procedures, and medical decision making performed by me.  
___________________________

## 2019-02-04 NOTE — DISCHARGE INSTRUCTIONS
Keep skin tag covered  Follow up as directed  Return to the ED for increased pain, redness, swelling or worsening of symptoms       Skin Tag Removal: Care Instructions  Your Care Instructions  Skin tags are small lumps of fleshy brown, tan, or pink skin. They are usually raised or hang from the skin on a small stalk. They often grow on the eyelids, neck, armpit, and groin. Skin tags are not moles and usually do not turn into cancer. You are more likely to get skin tags if you are overweight. They also tend to run in families. Skin tags may be removed if they bother you. Your doctor can remove an unwanted skin tag by simply cutting it off. However, new skin tags often form. Follow-up care is a key part of your treatment and safety. Be sure to make and go to all appointments, and call your doctor if you are having problems. It's also a good idea to know your test results and keep a list of the medicines you take. How can you care for yourself at home? · If clothing irritates a skin tag, cover it with a bandage to prevent rubbing and bleeding. · If you have a skin tag removed, clean the area with soap and water two times a day unless your doctor gives you different instructions. Don't use hydrogen peroxide or alcohol, which can slow healing. ? You may cover the wound with a thin layer of petroleum jelly, such as Vaseline, and a nonstick bandage. · Check all the skin on your body once a month for skin growths or other changes, such as color and feel of the skin. ?  front of a full-length mirror. Look carefully at the front and back of your body. Then look at your right and left sides with your arms raised. ? Bend your elbows and look carefully at your forearms, the back of your upper arms, and your palms. ? Look at your feet, the soles of your feet, and the spaces between your toes. ?  Use a hand mirror to look at the back of your legs, the back of your neck, and your back, rear end (buttocks), and genital area. Part the hair on your head to look at your scalp. · If you see a change in a skin growth, contact your doctor. Look for:  ? A mole that bleeds. ? A fast-growing mole. ? A scaly or crusted growth on the skin. ? A sore that will not heal.  When should you call for help? Call your doctor now or seek immediate medical care if:    · You have signs of infection such as:  ? Pain, warmth, or swelling in your skin. ? Red streaks near a wound in your skin. ? Pus coming from a wound in your skin. ? A fever.    Watch closely for changes in your health, and be sure to contact your doctor if:    · You have an area of normal skin that suddenly changes in shape, size, or how it looks.     · You do not get better as expected. Where can you learn more? Go to http://huber-richelle.info/. Enter ([phone] in the search box to learn more about \"Skin Tag Removal: Care Instructions. \"  Current as of: April 17, 2018  Content Version: 11.9  © 6498-8450 [employer]. Care instructions adapted under license by [employer] (which disclaims liability or warranty for this information). If you have questions about a medical condition or this instruction, always ask your healthcare professional. [address] any warranty or liability for your use of this information.

## 2019-02-04 NOTE — ED TRIAGE NOTES
Patient arrives ambulatory c/o a \"skin problem\". Patient states he rolled over upon waking this morning and felt like he got \"stabbed\" in the Left upper back. Upon inspection, he visualized a \"piece of skin hanging\" on his back. States he has never had a mole or skin tag in this area, that he can remember. Patient is calm and cooperative in triage.